# Patient Record
Sex: MALE | Race: WHITE | Employment: STUDENT | URBAN - METROPOLITAN AREA
[De-identification: names, ages, dates, MRNs, and addresses within clinical notes are randomized per-mention and may not be internally consistent; named-entity substitution may affect disease eponyms.]

---

## 2023-02-08 ENCOUNTER — LAB ENCOUNTER (OUTPATIENT)
Dept: LAB | Age: 23
End: 2023-02-08
Attending: FAMILY MEDICINE
Payer: MEDICAID

## 2023-02-08 DIAGNOSIS — M25.50 POLYARTHRALGIA: ICD-10-CM

## 2023-02-08 PROBLEM — F84.0 AUTISM (HCC): Status: ACTIVE | Noted: 2023-02-08

## 2023-02-08 PROBLEM — F84.0 AUTISM: Status: ACTIVE | Noted: 2023-02-08

## 2023-02-08 PROBLEM — J30.2 SEASONAL ALLERGIES: Status: ACTIVE | Noted: 2023-02-08

## 2023-02-08 PROBLEM — F98.8 ATTENTION DEFICIT DISORDER (ADD): Status: ACTIVE | Noted: 2023-02-08

## 2023-02-08 PROBLEM — J35.01 CHRONIC TONSILLITIS: Status: ACTIVE | Noted: 2023-02-08

## 2023-02-08 LAB
BASOPHILS # BLD AUTO: 0.05 X10(3) UL (ref 0–0.2)
BASOPHILS NFR BLD AUTO: 0.8 %
CRP SERPL-MCNC: <0.29 MG/DL (ref ?–0.3)
EOSINOPHIL # BLD AUTO: 0.07 X10(3) UL (ref 0–0.7)
EOSINOPHIL NFR BLD AUTO: 1.2 %
ERYTHROCYTE [DISTWIDTH] IN BLOOD BY AUTOMATED COUNT: 12.3 %
HCT VFR BLD AUTO: 47.2 %
HGB BLD-MCNC: 15.7 G/DL
IMM GRANULOCYTES # BLD AUTO: 0.02 X10(3) UL (ref 0–1)
IMM GRANULOCYTES NFR BLD: 0.3 %
LYMPHOCYTES # BLD AUTO: 1.61 X10(3) UL (ref 1–4)
LYMPHOCYTES NFR BLD AUTO: 26.5 %
MCH RBC QN AUTO: 29.7 PG (ref 26–34)
MCHC RBC AUTO-ENTMCNC: 33.3 G/DL (ref 31–37)
MCV RBC AUTO: 89.4 FL
MONOCYTES # BLD AUTO: 0.56 X10(3) UL (ref 0.1–1)
MONOCYTES NFR BLD AUTO: 9.2 %
NEUTROPHILS # BLD AUTO: 3.76 X10 (3) UL (ref 1.5–7.7)
NEUTROPHILS # BLD AUTO: 3.76 X10(3) UL (ref 1.5–7.7)
NEUTROPHILS NFR BLD AUTO: 62 %
PLATELET # BLD AUTO: 256 10(3)UL (ref 150–450)
RBC # BLD AUTO: 5.28 X10(6)UL
RHEUMATOID FACT SERPL-ACNC: 17 IU/ML (ref ?–15)
T4 FREE SERPL-MCNC: 1 NG/DL (ref 0.8–1.7)
TSI SER-ACNC: 1.4 MIU/ML (ref 0.36–3.74)
WBC # BLD AUTO: 6.1 X10(3) UL (ref 4–11)

## 2023-02-08 PROCEDURE — 85025 COMPLETE CBC W/AUTO DIFF WBC: CPT

## 2023-02-08 PROCEDURE — 84443 ASSAY THYROID STIM HORMONE: CPT

## 2023-02-08 PROCEDURE — 36415 COLL VENOUS BLD VENIPUNCTURE: CPT

## 2023-02-08 PROCEDURE — 86431 RHEUMATOID FACTOR QUANT: CPT

## 2023-02-08 PROCEDURE — 86140 C-REACTIVE PROTEIN: CPT

## 2023-02-08 PROCEDURE — 84439 ASSAY OF FREE THYROXINE: CPT

## 2023-02-09 PROBLEM — R76.8 RHEUMATOID FACTOR POSITIVE: Status: ACTIVE | Noted: 2023-02-09

## 2023-04-25 ENCOUNTER — TELEPHONE (OUTPATIENT)
Dept: FAMILY MEDICINE CLINIC | Facility: CLINIC | Age: 23
End: 2023-04-25

## 2023-04-25 NOTE — TELEPHONE ENCOUNTER
RECEIVED  8/24/23    SENT TO SCAN STAT  8/25/23    [place] [employer]  DIV OF REHAB [facility],DISABILITY DETERMINATION  [address] [address]  [place] [address]    ALL RECORDS FROM 4/6/22 TO PRESENT BY 5/3/23

## 2023-05-19 ENCOUNTER — OFFICE VISIT (OUTPATIENT)
Dept: RHEUMATOLOGY | Facility: CLINIC | Age: 23
End: 2023-05-19
Payer: MEDICAID

## 2023-05-19 VITALS
HEART RATE: 66 BPM | DIASTOLIC BLOOD PRESSURE: 58 MMHG | SYSTOLIC BLOOD PRESSURE: 90 MMHG | RESPIRATION RATE: 16 BRPM | OXYGEN SATURATION: 96 % | TEMPERATURE: 97 F | WEIGHT: 147.13 LBS | BODY MASS INDEX: 22.04 KG/M2 | HEIGHT: 68.5 IN

## 2023-05-19 DIAGNOSIS — M25.50 ARTHRALGIA, UNSPECIFIED JOINT: ICD-10-CM

## 2023-05-19 DIAGNOSIS — R76.8 RHEUMATOID FACTOR POSITIVE: ICD-10-CM

## 2023-05-19 DIAGNOSIS — M25.50 HYPERMOBILITY ARTHRALGIA: Primary | ICD-10-CM

## 2023-05-19 DIAGNOSIS — M24.80 GENERALIZED HYPERMOBILITY OF JOINTS: ICD-10-CM

## 2023-05-19 DIAGNOSIS — Z51.81 THERAPEUTIC DRUG MONITORING: ICD-10-CM

## 2023-05-19 PROCEDURE — 3074F SYST BP LT 130 MM HG: CPT | Performed by: INTERNAL MEDICINE

## 2023-05-19 PROCEDURE — 99244 OFF/OP CNSLTJ NEW/EST MOD 40: CPT | Performed by: INTERNAL MEDICINE

## 2023-05-19 PROCEDURE — 3078F DIAST BP <80 MM HG: CPT | Performed by: INTERNAL MEDICINE

## 2023-05-19 PROCEDURE — 3008F BODY MASS INDEX DOCD: CPT | Performed by: INTERNAL MEDICINE

## 2023-05-19 RX ORDER — CETIRIZINE HYDROCHLORIDE 10 MG/1
TABLET ORAL
COMMUNITY

## 2023-05-19 RX ORDER — DEXTROAMPHETAMINE SACCHARATE, AMPHETAMINE ASPARTATE MONOHYDRATE, DEXTROAMPHETAMINE SULFATE AND AMPHETAMINE SULFATE 7.5; 7.5; 7.5; 7.5 MG/1; MG/1; MG/1; MG/1
30 CAPSULE, EXTENDED RELEASE ORAL EVERY MORNING
COMMUNITY

## 2023-05-23 ENCOUNTER — DOCUMENTATION ONLY (OUTPATIENT)
Dept: FAMILY MEDICINE CLINIC | Facility: CLINIC | Age: 23
End: 2023-05-23

## 2023-05-23 PROBLEM — M21.40 FLAT FOOT: Status: ACTIVE | Noted: 2023-05-23

## 2023-05-24 ENCOUNTER — TELEPHONE (OUTPATIENT)
Dept: FAMILY MEDICINE CLINIC | Facility: CLINIC | Age: 23
End: 2023-05-24

## 2023-05-24 NOTE — TELEPHONE ENCOUNTER
desvenlafaxine ER 25 MG Oral Tablet 24 Hr         NEED ADDITIONAL INFORMATION    FAX IN [place] [facility] FILE

## 2023-06-27 ENCOUNTER — OFFICE VISIT (OUTPATIENT)
Facility: LOCATION | Age: 23
End: 2023-06-27
Attending: INTERNAL MEDICINE
Payer: MEDICAID

## 2023-06-27 DIAGNOSIS — M25.50 ARTHRALGIA, UNSPECIFIED JOINT: ICD-10-CM

## 2023-06-27 DIAGNOSIS — M24.80 GENERALIZED HYPERMOBILITY OF JOINTS: ICD-10-CM

## 2023-06-27 DIAGNOSIS — R76.8 RHEUMATOID FACTOR POSITIVE: Primary | ICD-10-CM

## 2023-06-27 DIAGNOSIS — Z51.81 THERAPEUTIC DRUG MONITORING: ICD-10-CM

## 2023-06-27 DIAGNOSIS — M25.50 HYPERMOBILITY ARTHRALGIA: ICD-10-CM

## 2023-06-27 PROCEDURE — 97110 THERAPEUTIC EXERCISES: CPT

## 2023-06-27 PROCEDURE — 97161 PT EVAL LOW COMPLEX 20 MIN: CPT

## 2023-07-06 ENCOUNTER — OFFICE VISIT (OUTPATIENT)
Facility: LOCATION | Age: 23
End: 2023-07-06
Attending: INTERNAL MEDICINE
Payer: MEDICAID

## 2023-07-06 PROCEDURE — 97110 THERAPEUTIC EXERCISES: CPT

## 2023-07-06 NOTE — PROGRESS NOTES
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)  Generalized hypermobility of joints (M24.80)  Arthralgia, unspecified joint (M25.50)   Referring Provider: Cecelia Sanchez  Date of Evaluation:    6/27/23    Precautions:  Autism,   ADHD, hypermobility  Next MD visit:   none scheduled  Date of Surgery: n/a   Insurance Primary/Secondary: BLUE CROSS MEDICAID / N/A     # Auth Visits: 8            Subjective: most pain is at night when done with activity     Pain:  3/10      Objective:   See flowsheet      Assessment:   Very vague in his ankle/foot and leg pain. Wearing good shoes and the orthotics purchased which does help to a certain extent  Significant pronation, navicular drop, abducted forefoot, hindfoot valgus       Goals:   Pt will demonstrate improved DF AROM to >= 15 degrees   Pt will have increased ankle strength to 5/5 throughout for improved ankle control with gait and stair negotiation  Pt will have demonstrate at least 4/5 (B) hip strength to maximize effectiveness and efficiency with functional activities   Pt will report <2/10 pain with work and home activities such  Pt will be able to walk 1-2 miles with <2/10 discomfort   Pt will be independent and compliant with comprehensive HEP to maintain progress achieved in PT     Plan: improve LE strength  Date: 7/6/2023  TX#: 2/8 Date:                 TX#: 3/ Date:                 TX#: 4/ Date:                 TX#: 5/ Date: Tx#: 6/   Manual Therapy              TherEx       Supine HS stretch x 10 (5 second hold)       Seated towel scrunch x 2 min       Seated (B) ankle/foot inversion picking up ball x 20       Seated ankle inversion GTB x 30       SL clam GTB x 20       SL hip abduction x 20       Bridge x 10 (5 seconds)       Standing runner's stretch (knee straight & bent) 30 sec x 3       Upright bike x 8 min        Ankle taping (navicular taping)       HEP: Standing gastroc and soleus stretch, seated towel scrunches     Charges:  TherEx 3       Total Timed Treatment: 40 min  Total Treatment Time: 40 min

## 2023-07-11 ENCOUNTER — APPOINTMENT (OUTPATIENT)
Facility: LOCATION | Age: 23
End: 2023-07-11
Attending: INTERNAL MEDICINE
Payer: MEDICAID

## 2023-07-13 ENCOUNTER — OFFICE VISIT (OUTPATIENT)
Facility: LOCATION | Age: 23
End: 2023-07-13
Attending: INTERNAL MEDICINE
Payer: MEDICAID

## 2023-07-13 PROCEDURE — 97110 THERAPEUTIC EXERCISES: CPT

## 2023-07-13 NOTE — PROGRESS NOTES
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 Encouraged to do this at home if it relieves pain  Walks with navicular drop, pronated foot, abducted feet. Tolerated exercises well with no significant increase in pain       Goals:   Pt will demonstrate improved DF AROM to >= 15 degrees  Pt will have increased ankle strength to 5/5 throughout for improved ankle control with gait and stair negotiation  Pt will have demonstrate at least 4/5 (B) hip strength to maximize effectiveness and efficiency with functional activities   Pt will report <2/10 pain with work and home activities such  Pt will be able to walk 1-2 miles with <2/10 discomfort   Pt will be independent and compliant with comprehensive HEP to maintain progress achieved in PT     Plan: improve LE strength  Date: 7/6/2023  TX#: 2/8 Date:               TX#:  Date:                 TX#: 4/ Date:                 TX#: 5/ Date:    Tx#: 6/   Manual Therapy Manual Therapy             TherEx TherEx      Supine HS stretch x 10 (5 second hold) Ankle DF self mobilization on step x 10 each (5 second hold)      Seated towel scrunch x 2 min Seated towel scrunch x 2 min      Seated (B) ankle/foot inversion picking up ball x 20 Seated (B) ankle/foot inversion picking up ball x 20      Seated ankle inversion GTB x 30 Seated ankle inversion GTB x 30      SL clam GTB x 20 Prostretch 1 min x 2 (B)       SL hip abduction x 20 Upright bike x 8 min (L5) Bridge x 10 (5 seconds) Did not do ankle taping this time (reports moisture from foot causes it to come off - did help a bit)      Standing runner's stretch (knee straight & bent) 30 sec x 3       Upright bike x 8 min        Ankle taping (navicular taping)       HEP: Standing gastroc and soleus stretch, seated towel scrunches     Charges:  TherEx 2       Total Timed Treatment: 30 min  Total Treatment Time: 30 min

## 2023-07-18 ENCOUNTER — APPOINTMENT (OUTPATIENT)
Facility: LOCATION | Age: 23
End: 2023-07-18
Attending: INTERNAL MEDICINE
Payer: MEDICAID

## 2023-07-20 ENCOUNTER — APPOINTMENT (OUTPATIENT)
Facility: LOCATION | Age: 23
End: 2023-07-20
Attending: INTERNAL MEDICINE
Payer: MEDICAID

## 2023-07-20 ENCOUNTER — TELEPHONE (OUTPATIENT)
Dept: PHYSICAL THERAPY | Facility: HOSPITAL | Age: 23
End: 2023-07-20

## 2023-07-24 ENCOUNTER — OFFICE VISIT (OUTPATIENT)
Facility: LOCATION | Age: 23
End: 2023-07-24
Attending: INTERNAL MEDICINE
Payer: MEDICAID

## 2023-07-24 PROCEDURE — 97110 THERAPEUTIC EXERCISES: CPT

## 2023-07-24 NOTE — PROGRESS NOTES
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 Did respond well to taping to support arch        Goals:   Pt will demonstrate improved DF AROM to >= 15 degrees  Pt will have increased ankle strength to 5/5 throughout for improved ankle control with gait and stair negotiation  Pt will have demonstrate at least 4/5 (B) hip strength to maximize effectiveness and efficiency with functional activities   Pt will report <2/10 pain with work and home activities such  Pt will be able to walk 1-2 miles with <2/10 discomfort   Pt will be independent and compliant with comprehensive HEP to maintain progress achieved in PT     Plan: improve LE strength  Date: 7/6/2023  TX#: 2/8 Date:               TX#: 3/8 Date: 7/24/23              TX#: 4/8 Date:                 TX#: 5/ Date:    Tx#: 6/   Manual Therapy Manual Therapy Manual Therapy            TherEx TherEx TherEx     Supine HS stretch x 10 (5 second hold) Ankle DF self mobilization on step x 10 each (5 second hold) Ankle DF self mobilization on step x 10 each (5 second hold)     Seated towel scrunch x 2 min Seated towel scrunch x 2 min Seated towel scrunch x 2 min     Seated (B) ankle/foot inversion picking up ball x 20 Seated (B) ankle/foot inversion picking up ball x 20 Seated (B) ankle/foot inversion picking up ball x 20     Seated ankle inversion GTB x 30 Seated ankle inversion GTB x 30 Seated ankle inversion GTB x 30     SL clam GTB x 20 Prostretch 1 min x 2 (B)  Prostretch 1 min x 2 (B)      SL hip abduction x 20 Upright bike x 8 min (L5)  Upright bike x 8 min (L5)      Bridge x 10 (5 seconds) Did not do ankle taping this time (reports moisture from foot causes it to come off - did help a bit) Bridge w/GTB around knees x 20     Standing runner's stretch (knee straight & bent) 30 sec x 3  Navicular taping to help support arch     Upright bike x 8 min   Shuttle 175# (DL) x 20  Shuttle 112# (SL) x 20     Ankle taping (navicular taping)  Heel raises x 20      HEP: Standing gastroc and soleus stretch, seated towel scrunches     Charges:  TherEx   3   Total Timed Treatment:40  min  Total Treatment Time: 40 min

## 2023-07-25 ENCOUNTER — APPOINTMENT (OUTPATIENT)
Facility: LOCATION | Age: 23
End: 2023-07-25
Attending: INTERNAL MEDICINE
Payer: MEDICAID

## 2023-07-25 NOTE — PROGRESS NOTES
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)  Generalized hypermobility of joints (M24.80)  Arthralgia, unspecified joint (M25.50)   Referring Provider: Tereza Sena  Date of Evaluation:    6/27/23    Precautions:  Autism,   ADHD, hypermobility  Next MD visit:   none scheduled  Date of Surgery: n/a   Insurance Primary/Secondary: BLUE CROSS MEDICAID / N/A     # Auth Visits: 8            Subjective: ***    Pain: ***      Objective:          Assessment:         Goals:   Pt will demonstrate improved DF AROM to >= 15 degrees  Pt will have increased ankle strength to 5/5 throughout for improved ankle control with gait and stair negotiation  Pt will have demonstrate at least 4/5 (B) hip strength to maximize effectiveness and efficiency with functional activities   Pt will report <2/10 pain with work and home activities such  Pt will be able to walk 1-2 miles with <2/10 discomfort   Pt will be independent and compliant with comprehensive HEP to maintain progress achieved in PT     Plan: improve LE strength  Date: 7/6/2023  TX#: 2/8 Date:               TX#: 3/8 Date: 7/24/23              TX#: 4/8 Date:             TX#: /8 Date:    Tx#: 6/   Manual Therapy Manual Therapy Manual Therapy Manual Therapy           TherEx TherEx TherEx TherEx    Supine HS stretch x 10 (5 second hold) Ankle DF self mobilization on step x 10 each (5 second hold) Ankle DF self mobilization on step x 10 each (5 second hold)     Seated towel scrunch x 2 min Seated towel scrunch x 2 min Seated towel scrunch x 2 min     Seated (B) ankle/foot inversion picking up ball x 20 Seated (B) ankle/foot inversion picking up ball x 20 Seated (B) ankle/foot inversion picking up ball x 20     Seated ankle inversion GTB x 30 Seated ankle inversion GTB x 30 Seated ankle inversion GTB x 30     SL clam GTB x 20 Prostretch 1 min x 2 (B)  Prostretch 1 min x 2 (B)      SL hip abduction x 20 Upright bike x 8 min (L5)  Upright bike x 8 min (L5)      Bridge x 10 (5 seconds) Did not do ankle taping this time (reports moisture from foot causes it to come off - did help a bit) Bridge w/GTB around knees x 20     Standing runner's stretch (knee straight & bent) 30 sec x 3  Navicular taping to help support arch     Upright bike x 8 min   Shuttle 175# (DL) x 20  Shuttle 112# (SL) x 20     Ankle taping (navicular taping)  Heel raises x 20      HEP: Standing gastroc and soleus stretch, seated towel scrunches     Charges:  TherEx      Total Timed Treatment:  min  Total Treatment Time:  min

## 2023-07-28 ENCOUNTER — APPOINTMENT (OUTPATIENT)
Facility: LOCATION | Age: 23
End: 2023-07-28
Attending: INTERNAL MEDICINE
Payer: MEDICAID

## 2023-07-28 NOTE — PROGRESS NOTES
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)  Generalized hypermobility of joints (M24.80)  Arthralgia, unspecified joint (M25.50)   Referring Provider: Sonia Samuels  Date of Evaluation:    6/27/23    Precautions:  Autism,   ADHD, hypermobility  Next MD visit:   none scheduled  Date of Surgery: n/a   Insurance Primary/Secondary: BLUE CROSS MEDICAID / N/A     # Auth Visits: 8            Subjective: ***    Pain: ***      Objective:    See flowsheet      Assessment:         Goals:   Pt will demonstrate improved DF AROM to >= 15 degrees  Pt will have increased ankle strength to 5/5 throughout for improved ankle control with gait and stair negotiation  Pt will have demonstrate at least 4/5 (B) hip strength to maximize effectiveness and efficiency with functional activities   Pt will report <2/10 pain with work and home activities   Pt will be able to walk 1-2 miles with <2/10 discomfort   Pt will be independent and compliant with comprehensive HEP to maintain progress achieved in PT     Plan: improve LE strength  Date: 7/6/2023  TX#: 2/8 Date:               TX#: 3/8 Date: 7/24/23              TX#: 4/8 Date: 7/28/23        TX#: 5/8 Date:    Tx#: 6/   Manual Therapy Manual Therapy Manual Therapy Manual Therapy           TherEx TherEx TherEx TherEx    Supine HS stretch x 10 (5 second hold) Ankle DF self mobilization on step x 10 each (5 second hold) Ankle DF self mobilization on step x 10 each (5 second hold)     Seated towel scrunch x 2 min Seated towel scrunch x 2 min Seated towel scrunch x 2 min     Seated (B) ankle/foot inversion picking up ball x 20 Seated (B) ankle/foot inversion picking up ball x 20 Seated (B) ankle/foot inversion picking up ball x 20     Seated ankle inversion GTB x 30 Seated ankle inversion GTB x 30 Seated ankle inversion GTB x 30     SL clam GTB x 20 Prostretch 1 min x 2 (B)  Prostretch 1 min x 2 (B)      SL hip abduction x 20 Upright bike x 8 min (L5)  Upright bike x 8 min (L5)      Bridge x 10 (5 seconds) Did not do ankle taping this time (reports moisture from foot causes it to come off - did help a bit) Bridge w/GTB around knees x 20     Standing runner's stretch (knee straight & bent) 30 sec x 3  Navicular taping to help support arch     Upright bike x 8 min   Shuttle 175# (DL) x 20  Shuttle 112# (SL) x 20     Ankle taping (navicular taping)  Heel raises x 20      HEP: Standing gastroc and soleus stretch, seated towel scrunches     Charges:  TherEx      Total Timed Treatment:  min  Total Treatment Time:  min

## 2023-08-10 ENCOUNTER — OFFICE VISIT (OUTPATIENT)
Facility: LOCATION | Age: 23
End: 2023-08-10
Payer: MEDICAID

## 2023-08-10 DIAGNOSIS — J35.1 CHRONIC TONSILLAR HYPERTROPHY: Primary | ICD-10-CM

## 2023-08-10 DIAGNOSIS — J35.01 CHRONIC TONSILLITIS: ICD-10-CM

## 2023-08-10 PROCEDURE — 99204 OFFICE O/P NEW MOD 45 MIN: CPT | Performed by: OTOLARYNGOLOGY

## 2023-08-10 RX ORDER — CEPHALEXIN 500 MG/1
500 CAPSULE ORAL DAILY
Qty: 30 CAPSULE | Refills: 1 | Status: SHIPPED | OUTPATIENT
Start: 2023-08-10 | End: 2023-09-09

## 2023-08-10 NOTE — PROGRESS NOTES
[facility], [facility] Yara Hatfield    Report of Consultation    Date of Consult: 8/10/2023     Reason for Consultation:   Recurrent tonsillitis. History of Present Illness:   Patient is a 23year old male who is being seen for recurrent tonsillitis. Patient notes he gets white dots in his tonsil bed and feels that he has to clear his throat. He denies fevers chills nausea or vomiting. He always has to clear his throat and he snores at times with halted respirations. Past Medical History  Past Medical History:   Diagnosis Date    Attention deficit disorder (ADD)     Autism     Seasonal allergies        Past Surgical History  History reviewed. No pertinent surgical history. Family History  Family History   Problem Relation Age of Onset    Juvenile Rheumatoid Arthritis Mother        Social History  Patient Parents    Yadiel Dupont (Mother)    Other Topics            Concern    None on file    Social History Narrative    None on file            Current Medications:  Current Outpatient Medications   Medication Sig Dispense Refill    cephalexin 500 MG Oral Cap Take 1 capsule (500 mg total) by mouth daily for 30 doses. 30 capsule 1    ARIPiprazole (ABILIFY) 10 MG Oral Tab Take 1 tablet (10 mg total) by mouth daily. 30 tablet 3    [START ON 8/24/2023] amphetamine-dextroamphetamine ER (ADDERALL XR) 30 MG Oral Capsule SR 24 Hr Take 1 capsule (30 mg total) by mouth daily. 30 capsule 0    [START ON 9/23/2023] amphetamine-dextroamphetamine ER (ADDERALL XR) 30 MG Oral Capsule SR 24 Hr Take 1 capsule (30 mg total) by mouth daily. 30 capsule 0    [START ON 10/23/2023] amphetamine-dextroamphetamine ER (ADDERALL XR) 30 MG Oral Capsule SR 24 Hr Take 1 capsule (30 mg total) by mouth daily. 30 capsule 0    [START ON 8/24/2023] methylphenidate (RITALIN) 10 MG Oral Tab Take 1 tablet (10 mg total) by mouth daily.  30 tablet 0    [START ON 9/23/2023] methylphenidate (RITALIN) 10 MG Oral Tab Take 1 tablet (10 mg total) by mouth daily. 30 tablet 0    [START ON 10/23/2023] methylphenidate (RITALIN) 10 MG Oral Tab Take 1 tablet (10 mg total) by mouth daily. 30 tablet 0    amphetamine-dextroamphetamine ER (ADDERALL XR) 30 MG Oral Capsule SR 24 Hr Take 1 capsule (30 mg total) by mouth daily. 30 capsule 0    methylphenidate (RITALIN) 10 MG Oral Tab Take 1 tablet (10 mg total) by mouth daily. In afternoon 30 tablet 0    cetirizine 10 MG Oral Tab cetirizine 10 mg tablet   TAKE 1 TABLET BY MOUTH AT BEDTIME         Allergies    Dander                  UNKNOWN  Molds & Smuts           UNKNOWN  Cat Hair Extract        Coughing, ITCHING, PHOTOSENSITIVITY    Review of Systems:   A comprehensive review of systems was negative. Physical Exam:   There were no vitals taken for this visit. Constitutional Normal Overall appearance - Normal.   Psychiatric Normal Orientation - Oriented to time, place, person & situation. Appropriate mood and affect. Head/Face Normal Facial features -- Normal. Skull - Normal.   Eyes Normal Pupils equal ,round ,react to light and accomidate   Ears Normal External Ear Right: Normal, Left: Normal. Canal - Right: Normal, Left: Normal. TM - Right: Normal, Left: Normal.   Nose Normal External Nose, Normal, Septum -Midline, Right, Left Turbinates - Right: Normal, Hypertrophic Left: Normal, Hypertrophic   Mouth/Throat Normal Lips/teeth/gums - Normal. Tonsils -4+ with large crypts. Neck Exam Normal Inspection - Normal. Palpation - Normal. Parotid gland - Normal. Thyroid gland - Normal.   Neurological Normal Memory - Normal. Cranial nerves - Cranial nerves II through XII grossly intact. Nasopharynx Normal  Normal        Skin Normal Inspection - Normal.        Lymph Detail Normal Submental. Submandibular. Anterior cervical. Posterior cervical. Supraclavicular.        Results:     Laboratory Data:  Lab Results   Component Value Date    WBC 6.1 02/08/2023    HGB 15.7 02/08/2023    HCT 47.2 02/08/2023 .0 02/08/2023    T4F 1.0 02/08/2023    TSH 1.400 02/08/2023    CRP <0.29 02/08/2023         Imaging:  No results found. Impression:   Chronic obstructive adenotonsillitis. Recommendations:  He will undergo tonsillectomy and adenectomy. Risk complications alternative treatments were discussed with the patient and mother and they understand. He will undergo procedure during one of his breaks. In the interim he was started on Keflex daily. Thank you for allowing me to participate in the care of your patient.       Corey Kim MD  8/10/2023

## 2023-08-14 DIAGNOSIS — J35.3 HYPERTROPHY OF TONSILS AND ADENOIDS: Primary | ICD-10-CM

## 2023-09-22 DIAGNOSIS — F98.8 ATTENTION DEFICIT DISORDER (ADD) WITHOUT HYPERACTIVITY: ICD-10-CM

## 2023-09-22 RX ORDER — DEXTROAMPHETAMINE SACCHARATE, AMPHETAMINE ASPARTATE MONOHYDRATE, DEXTROAMPHETAMINE SULFATE AND AMPHETAMINE SULFATE 7.5; 7.5; 7.5; 7.5 MG/1; MG/1; MG/1; MG/1
30 CAPSULE, EXTENDED RELEASE ORAL DAILY
Qty: 30 CAPSULE | Refills: 0 | Status: SHIPPED | OUTPATIENT
Start: 2023-09-22 | End: 2023-10-22

## 2023-09-22 NOTE — TELEPHONE ENCOUNTER
A refill request was received for:  Requested Prescriptions     Pending Prescriptions Disp Refills    amphetamine-dextroamphetamine ER (ADDERALL XR) 30 MG Oral Capsule SR 24 Hr 30 capsule 0     Sig: Take 1 capsule (30 mg total) by mouth daily.        Last refill date:10/23/2023       Last office visit: 8/1/2023    Follow up due:  Future Appointments   Date Time Provider [facility]   10/9/2023  3:00 PM Saqib Green MD [facility] [address] [facility] [address]   11/16/2023  9:30 AM Tyrese Emmanuel MD [id number] [address]

## 2023-10-13 ENCOUNTER — HOSPITAL ENCOUNTER (OUTPATIENT)
Facility: HOSPITAL | Age: 23
Setting detail: HOSPITAL OUTPATIENT SURGERY
Discharge: HOME | End: 2023-10-13
Attending: OTOLARYNGOLOGY | Admitting: OTOLARYNGOLOGY
Payer: MEDICAID

## 2023-10-13 ENCOUNTER — ANESTHESIA EVENT (OUTPATIENT)
Dept: SURGERY | Facility: HOSPITAL | Age: 23
End: 2023-10-13
Payer: MEDICAID

## 2023-10-13 ENCOUNTER — ANESTHESIA (OUTPATIENT)
Dept: SURGERY | Facility: HOSPITAL | Age: 23
End: 2023-10-13
Payer: MEDICAID

## 2023-10-13 VITALS
TEMPERATURE: 99 F | BODY MASS INDEX: 20.25 KG/M2 | OXYGEN SATURATION: 96 % | HEIGHT: 68.5 IN | RESPIRATION RATE: 18 BRPM | DIASTOLIC BLOOD PRESSURE: 81 MMHG | HEART RATE: 64 BPM | WEIGHT: 135.19 LBS | SYSTOLIC BLOOD PRESSURE: 117 MMHG

## 2023-10-13 DIAGNOSIS — J35.3 HYPERTROPHY OF TONSILS AND ADENOIDS: ICD-10-CM

## 2023-10-13 PROCEDURE — 0CBPXZZ EXCISION OF TONSILS, EXTERNAL APPROACH: ICD-10-PCS | Performed by: OTOLARYNGOLOGY

## 2023-10-13 PROCEDURE — 42826 REMOVAL OF TONSILS: CPT | Performed by: OTOLARYNGOLOGY

## 2023-10-13 RX ORDER — HYDROMORPHONE HYDROCHLORIDE 1 MG/ML
0.4 INJECTION, SOLUTION INTRAMUSCULAR; INTRAVENOUS; SUBCUTANEOUS EVERY 5 MIN PRN
Status: DISCONTINUED | OUTPATIENT
Start: 2023-10-13 | End: 2023-10-13

## 2023-10-13 RX ORDER — SODIUM CHLORIDE, SODIUM LACTATE, POTASSIUM CHLORIDE, CALCIUM CHLORIDE 600; 310; 30; 20 MG/100ML; MG/100ML; MG/100ML; MG/100ML
INJECTION, SOLUTION INTRAVENOUS CONTINUOUS
Status: DISCONTINUED | OUTPATIENT
Start: 2023-10-13 | End: 2023-10-13

## 2023-10-13 RX ORDER — BUPIVACAINE HYDROCHLORIDE AND EPINEPHRINE 5; 5 MG/ML; UG/ML
INJECTION, SOLUTION EPIDURAL; INTRACAUDAL; PERINEURAL AS NEEDED
Status: DISCONTINUED | OUTPATIENT
Start: 2023-10-13 | End: 2023-10-13 | Stop reason: HOSPADM

## 2023-10-13 RX ORDER — NEOSTIGMINE METHYLSULFATE 1 MG/ML
INJECTION, SOLUTION INTRAVENOUS AS NEEDED
Status: DISCONTINUED | OUTPATIENT
Start: 2023-10-13 | End: 2023-10-13 | Stop reason: SURG

## 2023-10-13 RX ORDER — HYDROCODONE BITARTRATE AND ACETAMINOPHEN 5; 325 MG/1; MG/1
1 TABLET ORAL ONCE AS NEEDED
Status: DISCONTINUED | OUTPATIENT
Start: 2023-10-13 | End: 2023-10-13

## 2023-10-13 RX ORDER — NALOXONE HYDROCHLORIDE 0.4 MG/ML
80 INJECTION, SOLUTION INTRAMUSCULAR; INTRAVENOUS; SUBCUTANEOUS AS NEEDED
Status: DISCONTINUED | OUTPATIENT
Start: 2023-10-13 | End: 2023-10-13

## 2023-10-13 RX ORDER — DEXAMETHASONE SODIUM PHOSPHATE 4 MG/ML
VIAL (ML) INJECTION AS NEEDED
Status: DISCONTINUED | OUTPATIENT
Start: 2023-10-13 | End: 2023-10-13 | Stop reason: SURG

## 2023-10-13 RX ORDER — SCOLOPAMINE TRANSDERMAL SYSTEM 1 MG/1
1 PATCH, EXTENDED RELEASE TRANSDERMAL ONCE
Status: DISCONTINUED | OUTPATIENT
Start: 2023-10-13 | End: 2023-10-13 | Stop reason: HOSPADM

## 2023-10-13 RX ORDER — GLYCOPYRROLATE 0.2 MG/ML
INJECTION, SOLUTION INTRAMUSCULAR; INTRAVENOUS AS NEEDED
Status: DISCONTINUED | OUTPATIENT
Start: 2023-10-13 | End: 2023-10-13 | Stop reason: SURG

## 2023-10-13 RX ORDER — ACETAMINOPHEN 500 MG
1000 TABLET ORAL ONCE AS NEEDED
Status: DISCONTINUED | OUTPATIENT
Start: 2023-10-13 | End: 2023-10-13

## 2023-10-13 RX ORDER — DIPHENHYDRAMINE HYDROCHLORIDE 50 MG/ML
12.5 INJECTION INTRAMUSCULAR; INTRAVENOUS AS NEEDED
Status: DISCONTINUED | OUTPATIENT
Start: 2023-10-13 | End: 2023-10-13

## 2023-10-13 RX ORDER — LIDOCAINE HYDROCHLORIDE 10 MG/ML
INJECTION, SOLUTION EPIDURAL; INFILTRATION; INTRACAUDAL; PERINEURAL AS NEEDED
Status: DISCONTINUED | OUTPATIENT
Start: 2023-10-13 | End: 2023-10-13 | Stop reason: SURG

## 2023-10-13 RX ORDER — ACETAMINOPHEN 500 MG
1000 TABLET ORAL ONCE
Status: DISCONTINUED | OUTPATIENT
Start: 2023-10-13 | End: 2023-10-13 | Stop reason: HOSPADM

## 2023-10-13 RX ORDER — LABETALOL HYDROCHLORIDE 5 MG/ML
5 INJECTION, SOLUTION INTRAVENOUS EVERY 5 MIN PRN
Status: DISCONTINUED | OUTPATIENT
Start: 2023-10-13 | End: 2023-10-13

## 2023-10-13 RX ORDER — HYDROCODONE BITARTRATE AND ACETAMINOPHEN 5; 325 MG/1; MG/1
2 TABLET ORAL ONCE AS NEEDED
Status: DISCONTINUED | OUTPATIENT
Start: 2023-10-13 | End: 2023-10-13

## 2023-10-13 RX ORDER — HYDROMORPHONE HYDROCHLORIDE 1 MG/ML
0.6 INJECTION, SOLUTION INTRAMUSCULAR; INTRAVENOUS; SUBCUTANEOUS EVERY 5 MIN PRN
Status: DISCONTINUED | OUTPATIENT
Start: 2023-10-13 | End: 2023-10-13

## 2023-10-13 RX ORDER — ROCURONIUM BROMIDE 10 MG/ML
INJECTION, SOLUTION INTRAVENOUS AS NEEDED
Status: DISCONTINUED | OUTPATIENT
Start: 2023-10-13 | End: 2023-10-13 | Stop reason: SURG

## 2023-10-13 RX ORDER — ACETAMINOPHEN 325 MG/1
650 TABLET ORAL ONCE
Status: DISCONTINUED | OUTPATIENT
Start: 2023-10-13 | End: 2023-10-13

## 2023-10-13 RX ORDER — ONDANSETRON 2 MG/ML
INJECTION INTRAMUSCULAR; INTRAVENOUS AS NEEDED
Status: DISCONTINUED | OUTPATIENT
Start: 2023-10-13 | End: 2023-10-13 | Stop reason: SURG

## 2023-10-13 RX ORDER — ONDANSETRON 2 MG/ML
4 INJECTION INTRAMUSCULAR; INTRAVENOUS EVERY 6 HOURS PRN
Status: DISCONTINUED | OUTPATIENT
Start: 2023-10-13 | End: 2023-10-13

## 2023-10-13 RX ORDER — PROCHLORPERAZINE EDISYLATE 5 MG/ML
5 INJECTION INTRAMUSCULAR; INTRAVENOUS EVERY 8 HOURS PRN
Status: DISCONTINUED | OUTPATIENT
Start: 2023-10-13 | End: 2023-10-13

## 2023-10-13 RX ORDER — MIDAZOLAM HYDROCHLORIDE 1 MG/ML
INJECTION INTRAMUSCULAR; INTRAVENOUS AS NEEDED
Status: DISCONTINUED | OUTPATIENT
Start: 2023-10-13 | End: 2023-10-13 | Stop reason: SURG

## 2023-10-13 RX ORDER — ONDANSETRON 4 MG/1
4 TABLET, ORALLY DISINTEGRATING ORAL EVERY 4 HOURS PRN
Qty: 10 TABLET | Refills: 1 | Status: SHIPPED | OUTPATIENT
Start: 2023-10-13

## 2023-10-13 RX ORDER — MEPERIDINE HYDROCHLORIDE 25 MG/ML
INJECTION INTRAMUSCULAR; INTRAVENOUS; SUBCUTANEOUS
Status: COMPLETED
Start: 2023-10-13 | End: 2023-10-13

## 2023-10-13 RX ORDER — HYDROMORPHONE HYDROCHLORIDE 1 MG/ML
0.2 INJECTION, SOLUTION INTRAMUSCULAR; INTRAVENOUS; SUBCUTANEOUS EVERY 5 MIN PRN
Status: DISCONTINUED | OUTPATIENT
Start: 2023-10-13 | End: 2023-10-13

## 2023-10-13 RX ORDER — MEPERIDINE HYDROCHLORIDE 25 MG/ML
12.5 INJECTION INTRAMUSCULAR; INTRAVENOUS; SUBCUTANEOUS AS NEEDED
Status: DISCONTINUED | OUTPATIENT
Start: 2023-10-13 | End: 2023-10-13

## 2023-10-13 RX ADMIN — GLYCOPYRROLATE 0.4 MG: 0.2 INJECTION, SOLUTION INTRAMUSCULAR; INTRAVENOUS at 08:06:00

## 2023-10-13 RX ADMIN — SODIUM CHLORIDE, SODIUM LACTATE, POTASSIUM CHLORIDE, CALCIUM CHLORIDE: 600; 310; 30; 20 INJECTION, SOLUTION INTRAVENOUS at 07:34:00

## 2023-10-13 RX ADMIN — ROCURONIUM BROMIDE 10 MG: 10 INJECTION, SOLUTION INTRAVENOUS at 07:40:00

## 2023-10-13 RX ADMIN — MIDAZOLAM HYDROCHLORIDE 2 MG: 1 INJECTION INTRAMUSCULAR; INTRAVENOUS at 07:34:00

## 2023-10-13 RX ADMIN — DEXAMETHASONE SODIUM PHOSPHATE 8 MG: 4 MG/ML VIAL (ML) INJECTION at 07:42:00

## 2023-10-13 RX ADMIN — ONDANSETRON 4 MG: 2 INJECTION INTRAMUSCULAR; INTRAVENOUS at 07:34:00

## 2023-10-13 RX ADMIN — LIDOCAINE HYDROCHLORIDE 100 MG: 10 INJECTION, SOLUTION EPIDURAL; INFILTRATION; INTRACAUDAL; PERINEURAL at 07:40:00

## 2023-10-13 RX ADMIN — NEOSTIGMINE METHYLSULFATE 3 MG: 1 INJECTION, SOLUTION INTRAVENOUS at 08:06:00

## 2023-10-13 NOTE — OPERATIVE REPORT
[facility]    PATIENT'S NAME: Carley Limon   ATTENDING PHYSICIAN: Gary Beyer M.D. OPERATING PHYSICIAN: Gary Beyer M.D. PATIENT ACCOUNT#:   [de-identified]    LOCATION:  [id number] [place] [address] #:   [id number]       YOB: 2000  ADMISSION DATE:       10/13/2023      OPERATION DATE:  10/13/2023    OPERATIVE REPORT      PREOPERATIVE DIAGNOSIS:  Chronic and obstructive tonsillitis. POSTOPERATIVE DIAGNOSIS:  Chronic and obstructive tonsillitis. PROCEDURE:  Tonsillectomy. ANESTHESIA:  General.    OPERATIVE TECHNIQUE:  The patient was taken to the operating room and placed in a supine position. After orotracheal intubation, routine prep and drape, procedure was commenced. The patient was in Shola position. McIvor mouth gag was placed and used to retract the tongue. Examination of the adenoid bed found no tissue present. First the right tonsil was grasped with an Allis forceps. Anterior, superior, and posterior pillars were incised with Coblation forceps. It was dissected down to its base and then removed. A like procedure was performed on left tonsil. Hemostasis was achieved with a combination of Coblation and suction cautery, and no further bleeding was seen. Approximately 5 mL of 0.5% Marcaine with epinephrine 1:100,000 were injected locally. The patient was awoken in the operating room and transferred to the recovery room in stable condition. Estimated blood loss was 10 mL.     Dictated By Gary Beyer M.D.  d: 10/13/2023 08:12:20  t: 10/13/2023 08:25:17  [facility] [id number]  /

## 2023-10-13 NOTE — BRIEF OP NOTE
Pre-Operative Diagnosis: Hypertrophy of tonsils and adenoids [J35.3]     Post-Operative Diagnosis: Hypertrophy of tonsils and adenoids [J35.3]      Procedure Performed:   Bilateral Tonsillectomy    Surgeon(s) and Role:     Jeronimo Cook MD - Primary    Assistant(s):        Surgical Findings: tonsils 4+, no adenoids present     Specimen: tonsils     Estimated Blood Loss: Blood Output: 10 mL (10/13/2023  8:01 AM)      Dictation Number:  done    Irina Hung MD  10/13/2023  8:08 AM

## 2023-10-18 ENCOUNTER — OFFICE VISIT (OUTPATIENT)
Facility: LOCATION | Age: 23
End: 2023-10-18
Payer: MEDICAID

## 2023-10-18 DIAGNOSIS — J35.01 CHRONIC TONSILLITIS: ICD-10-CM

## 2023-10-18 DIAGNOSIS — J35.1 CHRONIC TONSILLAR HYPERTROPHY: Primary | ICD-10-CM

## 2023-10-18 PROCEDURE — 99024 POSTOP FOLLOW-UP VISIT: CPT | Performed by: OTOLARYNGOLOGY

## 2023-10-18 RX ORDER — HYDROCODONE BITARTRATE AND ACETAMINOPHEN 5; 325 MG/1; MG/1
1 TABLET ORAL EVERY 6 HOURS PRN
Qty: 40 TABLET | Refills: 0 | Status: SHIPPED | OUTPATIENT
Start: 2023-10-18

## 2023-10-18 NOTE — PROGRESS NOTES
He is here for recheck after tonsillectomy. Denies fevers chills or other constitutional complaints. He is clearing his throat frequently. Physical exam: Right and left TMs appear normal.  Nose was patent. Oral cavity shows usual scabbing about the tonsil bed. No active sign of bleeding. Patient is doing well he was given a school excuse after surgery. He was given Norco pills instead of liquid. May gradually increase his activity and diet.

## 2023-10-19 ENCOUNTER — HOSPITAL ENCOUNTER (EMERGENCY)
Facility: HOSPITAL | Age: 23
Discharge: LEFT BEFORE DISCHARGE | End: 2023-10-19
Payer: MEDICAID

## 2023-10-20 ENCOUNTER — HOSPITAL ENCOUNTER (EMERGENCY)
Facility: HOSPITAL | Age: 23
Discharge: HOME | End: 2023-10-21
Attending: EMERGENCY MEDICINE
Payer: MEDICAID

## 2023-10-20 DIAGNOSIS — J35.8 TONSILLAR BLEED: Primary | ICD-10-CM

## 2023-10-21 ENCOUNTER — ANESTHESIA EVENT (OUTPATIENT)
Dept: SURGERY | Facility: HOSPITAL | Age: 23
End: 2023-10-21
Payer: MEDICAID

## 2023-10-21 ENCOUNTER — ANESTHESIA (OUTPATIENT)
Dept: SURGERY | Facility: HOSPITAL | Age: 23
End: 2023-10-21
Payer: MEDICAID

## 2023-10-21 VITALS
HEART RATE: 94 BPM | WEIGHT: 135 LBS | HEIGHT: 70 IN | RESPIRATION RATE: 14 BRPM | BODY MASS INDEX: 19.33 KG/M2 | TEMPERATURE: 98 F | SYSTOLIC BLOOD PRESSURE: 113 MMHG | OXYGEN SATURATION: 100 % | DIASTOLIC BLOOD PRESSURE: 74 MMHG

## 2023-10-21 PROBLEM — J35.8 TONSILLAR BLEED: Status: ACTIVE | Noted: 2023-10-21

## 2023-10-21 LAB
ALBUMIN SERPL-MCNC: 4 G/DL (ref 3.4–5)
ALBUMIN/GLOB SERPL: 1.1 {RATIO} (ref 1–2)
ALP LIVER SERPL-CCNC: 57 U/L
ALT SERPL-CCNC: 22 U/L
ANION GAP SERPL CALC-SCNC: 6 MMOL/L (ref 0–18)
AST SERPL-CCNC: 19 U/L (ref 15–37)
BASOPHILS # BLD AUTO: 0.05 X10(3) UL (ref 0–0.2)
BASOPHILS NFR BLD AUTO: 0.6 %
BILIRUB SERPL-MCNC: 1 MG/DL (ref 0.1–2)
BUN BLD-MCNC: 12 MG/DL (ref 7–18)
CALCIUM BLD-MCNC: 9.4 MG/DL (ref 8.5–10.1)
CHLORIDE SERPL-SCNC: 104 MMOL/L (ref 98–112)
CO2 SERPL-SCNC: 26 MMOL/L (ref 21–32)
CREAT BLD-MCNC: 1.11 MG/DL
EGFRCR SERPLBLD CKD-EPI 2021: 97 ML/MIN/1.73M2 (ref 60–?)
EOSINOPHIL # BLD AUTO: 0.15 X10(3) UL (ref 0–0.7)
EOSINOPHIL NFR BLD AUTO: 1.7 %
ERYTHROCYTE [DISTWIDTH] IN BLOOD BY AUTOMATED COUNT: 12.3 %
GLOBULIN PLAS-MCNC: 3.6 G/DL (ref 2.8–4.4)
GLUCOSE BLD-MCNC: 116 MG/DL (ref 70–99)
HCT VFR BLD AUTO: 44.1 %
HGB BLD-MCNC: 15 G/DL
IMM GRANULOCYTES # BLD AUTO: 0.02 X10(3) UL (ref 0–1)
IMM GRANULOCYTES NFR BLD: 0.2 %
LYMPHOCYTES # BLD AUTO: 2.94 X10(3) UL (ref 1–4)
LYMPHOCYTES NFR BLD AUTO: 33.1 %
MCH RBC QN AUTO: 29 PG (ref 26–34)
MCHC RBC AUTO-ENTMCNC: 34 G/DL (ref 31–37)
MCV RBC AUTO: 85.1 FL
MONOCYTES # BLD AUTO: 0.81 X10(3) UL (ref 0.1–1)
MONOCYTES NFR BLD AUTO: 9.1 %
NEUTROPHILS # BLD AUTO: 4.91 X10 (3) UL (ref 1.5–7.7)
NEUTROPHILS # BLD AUTO: 4.91 X10(3) UL (ref 1.5–7.7)
NEUTROPHILS NFR BLD AUTO: 55.3 %
OSMOLALITY SERPL CALC.SUM OF ELEC: 283 MOSM/KG (ref 275–295)
PLATELET # BLD AUTO: 431 10(3)UL (ref 150–450)
POTASSIUM SERPL-SCNC: 4 MMOL/L (ref 3.5–5.1)
PROT SERPL-MCNC: 7.6 G/DL (ref 6.4–8.2)
RBC # BLD AUTO: 5.18 X10(6)UL
SODIUM SERPL-SCNC: 136 MMOL/L (ref 136–145)
WBC # BLD AUTO: 8.9 X10(3) UL (ref 4–11)

## 2023-10-21 PROCEDURE — 0W33XZZ CONTROL BLEEDING IN ORAL CAVITY AND THROAT, EXTERNAL APPROACH: ICD-10-PCS | Performed by: OTOLARYNGOLOGY

## 2023-10-21 PROCEDURE — 42960 CONTROL THROAT BLEEDING: CPT | Performed by: OTOLARYNGOLOGY

## 2023-10-21 RX ORDER — HYDROMORPHONE HYDROCHLORIDE 1 MG/ML
0.6 INJECTION, SOLUTION INTRAMUSCULAR; INTRAVENOUS; SUBCUTANEOUS EVERY 5 MIN PRN
Status: DISCONTINUED | OUTPATIENT
Start: 2023-10-21 | End: 2023-10-21

## 2023-10-21 RX ORDER — SODIUM CHLORIDE, SODIUM LACTATE, POTASSIUM CHLORIDE, CALCIUM CHLORIDE 600; 310; 30; 20 MG/100ML; MG/100ML; MG/100ML; MG/100ML
INJECTION, SOLUTION INTRAVENOUS CONTINUOUS
Status: DISCONTINUED | OUTPATIENT
Start: 2023-10-21 | End: 2023-10-21

## 2023-10-21 RX ORDER — ESMOLOL HYDROCHLORIDE 10 MG/ML
INJECTION INTRAVENOUS AS NEEDED
Status: DISCONTINUED | OUTPATIENT
Start: 2023-10-21 | End: 2023-10-21 | Stop reason: SURG

## 2023-10-21 RX ORDER — NALOXONE HYDROCHLORIDE 0.4 MG/ML
0.08 INJECTION, SOLUTION INTRAMUSCULAR; INTRAVENOUS; SUBCUTANEOUS AS NEEDED
Status: DISCONTINUED | OUTPATIENT
Start: 2023-10-21 | End: 2023-10-21

## 2023-10-21 RX ORDER — PROCHLORPERAZINE EDISYLATE 5 MG/ML
5 INJECTION INTRAMUSCULAR; INTRAVENOUS EVERY 8 HOURS PRN
Status: DISCONTINUED | OUTPATIENT
Start: 2023-10-21 | End: 2023-10-21

## 2023-10-21 RX ORDER — ONDANSETRON 2 MG/ML
4 INJECTION INTRAMUSCULAR; INTRAVENOUS EVERY 6 HOURS PRN
Status: DISCONTINUED | OUTPATIENT
Start: 2023-10-21 | End: 2023-10-21

## 2023-10-21 RX ORDER — ACETAMINOPHEN 500 MG
1000 TABLET ORAL ONCE AS NEEDED
Status: DISCONTINUED | OUTPATIENT
Start: 2023-10-21 | End: 2023-10-21

## 2023-10-21 RX ORDER — HYDROMORPHONE HYDROCHLORIDE 1 MG/ML
0.4 INJECTION, SOLUTION INTRAMUSCULAR; INTRAVENOUS; SUBCUTANEOUS EVERY 5 MIN PRN
Status: DISCONTINUED | OUTPATIENT
Start: 2023-10-21 | End: 2023-10-21

## 2023-10-21 RX ORDER — DEXAMETHASONE SODIUM PHOSPHATE 4 MG/ML
VIAL (ML) INJECTION AS NEEDED
Status: DISCONTINUED | OUTPATIENT
Start: 2023-10-21 | End: 2023-10-21 | Stop reason: SURG

## 2023-10-21 RX ORDER — LIDOCAINE HYDROCHLORIDE 10 MG/ML
INJECTION, SOLUTION EPIDURAL; INFILTRATION; INTRACAUDAL; PERINEURAL AS NEEDED
Status: DISCONTINUED | OUTPATIENT
Start: 2023-10-21 | End: 2023-10-21 | Stop reason: SURG

## 2023-10-21 RX ORDER — BUPIVACAINE HYDROCHLORIDE AND EPINEPHRINE 2.5; 5 MG/ML; UG/ML
INJECTION, SOLUTION EPIDURAL; INFILTRATION; INTRACAUDAL; PERINEURAL AS NEEDED
Status: DISCONTINUED | OUTPATIENT
Start: 2023-10-21 | End: 2023-10-21 | Stop reason: HOSPADM

## 2023-10-21 RX ORDER — HYDROCODONE BITARTRATE AND ACETAMINOPHEN 5; 325 MG/1; MG/1
2 TABLET ORAL ONCE AS NEEDED
Status: DISCONTINUED | OUTPATIENT
Start: 2023-10-21 | End: 2023-10-21

## 2023-10-21 RX ORDER — ONDANSETRON 2 MG/ML
INJECTION INTRAMUSCULAR; INTRAVENOUS AS NEEDED
Status: DISCONTINUED | OUTPATIENT
Start: 2023-10-21 | End: 2023-10-21 | Stop reason: SURG

## 2023-10-21 RX ORDER — SODIUM CHLORIDE, SODIUM LACTATE, POTASSIUM CHLORIDE, CALCIUM CHLORIDE 600; 310; 30; 20 MG/100ML; MG/100ML; MG/100ML; MG/100ML
INJECTION, SOLUTION INTRAVENOUS CONTINUOUS PRN
Status: DISCONTINUED | OUTPATIENT
Start: 2023-10-21 | End: 2023-10-21 | Stop reason: SURG

## 2023-10-21 RX ORDER — PHENYLEPHRINE HCL 10 MG/ML
VIAL (ML) INJECTION AS NEEDED
Status: DISCONTINUED | OUTPATIENT
Start: 2023-10-21 | End: 2023-10-21 | Stop reason: SURG

## 2023-10-21 RX ORDER — HYDROMORPHONE HYDROCHLORIDE 1 MG/ML
0.2 INJECTION, SOLUTION INTRAMUSCULAR; INTRAVENOUS; SUBCUTANEOUS EVERY 5 MIN PRN
Status: DISCONTINUED | OUTPATIENT
Start: 2023-10-21 | End: 2023-10-21

## 2023-10-21 RX ORDER — HYDROCODONE BITARTRATE AND ACETAMINOPHEN 5; 325 MG/1; MG/1
1 TABLET ORAL ONCE AS NEEDED
Status: DISCONTINUED | OUTPATIENT
Start: 2023-10-21 | End: 2023-10-21

## 2023-10-21 RX ADMIN — SODIUM CHLORIDE, SODIUM LACTATE, POTASSIUM CHLORIDE, CALCIUM CHLORIDE: 600; 310; 30; 20 INJECTION, SOLUTION INTRAVENOUS at 03:16:00

## 2023-10-21 RX ADMIN — ESMOLOL HYDROCHLORIDE 30 MG: 10 INJECTION INTRAVENOUS at 03:20:00

## 2023-10-21 RX ADMIN — PHENYLEPHRINE HCL 100 MCG: 10 MG/ML VIAL (ML) INJECTION at 03:37:00

## 2023-10-21 RX ADMIN — DEXAMETHASONE SODIUM PHOSPHATE 8 MG: 4 MG/ML VIAL (ML) INJECTION at 03:27:00

## 2023-10-21 RX ADMIN — ONDANSETRON 4 MG: 2 INJECTION INTRAMUSCULAR; INTRAVENOUS at 03:27:00

## 2023-10-21 RX ADMIN — LIDOCAINE HYDROCHLORIDE 60 MG: 10 INJECTION, SOLUTION EPIDURAL; INFILTRATION; INTRACAUDAL; PERINEURAL at 03:20:00

## 2023-10-21 RX ADMIN — PHENYLEPHRINE HCL 100 MCG: 10 MG/ML VIAL (ML) INJECTION at 03:48:00

## 2023-10-21 RX ADMIN — PHENYLEPHRINE HCL 100 MCG: 10 MG/ML VIAL (ML) INJECTION at 03:43:00

## 2023-10-21 NOTE — ANESTHESIA POSTPROCEDURE EVALUATION
[address] Patient Status:  Emergency   Age/Gender 26year old male MRN [id number]   Location [facility] Attending Theodore Rivera MD   Hosp Day # 0 PCP Ángela Bell MD       Anesthesia Post-op Note    CONTROL OF BLEEDERS POST-TONSILLECTOMY    Procedure Summary       Date: 10/21/23 Room / Location: [facility] OR 03 / [facility]4 [facility] OR    Anesthesia Start: 0316 Anesthesia Stop: 4644    Procedure: CONTROL OF BLEEDERS POST-TONSILLECTOMY Diagnosis:       Status post tonsillectomy      (Status post tonsillectomy [Z90.89])    Surgeons: Theodore Rivera MD Anesthesiologist: Annabella Jolley MD    Anesthesia Type: regional ASA Status: 1            Anesthesia Type: regional    Vitals Value Taken Time   /68 10/21/23 0401   Temp 97.8 10/21/23 0406   Pulse 103 10/21/23 0405   Resp 16 10/21/23 0405   SpO2 100 % 10/21/23 0405   Vitals shown include unfiled device data. Patient Location: PACU    Anesthesia Type: general    Airway Patency: patent and extubated    Postop Pain Control: adequate    Mental Status: preanesthetic baseline    Nausea/Vomiting: none    Cardiopulmonary/Hydration status: stable euvolemic    Complications: no apparent anesthesia related complications    Postop vital signs: stable    Dental Exam: Unchanged from Preop    Patient to be discharged from PACU when criteria met.

## 2023-10-21 NOTE — ED QUICK NOTES
Assumed care of pt from [address] [place] [address]. Pt arrives comfortable. C/o discomfort to back of throat and feeling \"a large clot\" in the throat. Pt speaking in full, clear sentences and managing secretions at this time.  No bleeding noted on arrival.

## 2023-10-21 NOTE — ED INITIAL ASSESSMENT (HPI)
Pt to ed with c/o vomiting blood s/p tonsillectomy on 10/13. States pt was cleared on Wednesday. Vomiting blood started yesterday and had been more constant since 1600. Pt states \"there is a wound that keeps opening and closing. \"

## 2023-10-21 NOTE — ANESTHESIA PROCEDURE NOTES
Airway  Date/Time: 10/21/2023 3:23 AM  Urgency: elective    Airway not difficult    General Information and Staff    Patient location during procedure: OR  Anesthesiologist: Nandini Greenberg MD  Performed: anesthesiologist   Performed by: Nandini Greenberg MD  Authorized by: Nandini Greenberg MD      Indications and Patient Condition  Indications for airway management: anesthesia  Spontaneous Ventilation: absent  Sedation level: deep  Preoxygenated: yes  Patient position: sniffing  Mask difficulty assessment: 0 - not attempted    Final Airway Details  Final airway type: endotracheal airway      Successful airway: ETT  Cuffed: yes   Successful intubation technique: direct laryngoscopy  Facilitating devices/methods: intubating stylet  Endotracheal tube insertion site: oral  Blade: Rich  Blade size: #4  ETT size (mm): 7.5    Cormack-Lehane Classification: grade IIB - view of arytenoids or posterior of glottis only  Placement verified by: capnometry   Cuff volume (mL): 7  Measured from: lips  ETT to lips (cm): 23  Number of attempts at approach: 1  Number of other approaches attempted: 0

## 2023-10-21 NOTE — OPERATIVE REPORT
[facility]  Op Note    Arvella Main Location: OR   CSN [id number] MRN [id number]   Admission Date 10/20/2023 Operation Date 10/21/2023   Attending Physician Ether Simmonds, MD Operating Physician Elana Tillman MD     Pre-Operative Diagnosis: Status post tonsillectomy [Z90.89]    Post-Operative Diagnosis: Same as above    The patient is status post tonsillectomy. I instructed the patient and his mother of the risk benefits and alternatives to this procedure. The risks are to include but not limited to bleeding along with need for further surgery, serious bleeding and pain. Procedure Performed: Procedure(s):  CONTROL OF BLEEDERS POST-TONSILLECTOMY    Surgeon: Surgeon(s):  Ether Simmonds, MD     Anesthesia: General        Summary of Case: After satisfactory general endotracheal anesthesia induction the patient was prepared for the procedure. The Linn Edis Paz was placed. The left tonsil bed was evaluated and noted to be normal.  There was clot at the right tonsil bed. I suctioned the clot from the right tonsil bed. The patient was noted to have a bleeding vessel inferiorly. Suction Bovie was utilized to control the bleeding. The mouthgag was let down. After a few minutes the mouthgag was reexpanded. There is no further signs of bleeding. 1/4% Marcaine with epinephrine was injected at the right tonsil pillar. An NG tube was placed into the stomach to suction out any stomach contents. The mouthgag was removed. Patient was awoken extubated and transferred recovery room in stable condition.     Elana Tillman MD  10/21/2023  4:00 AM

## 2023-10-25 ENCOUNTER — OFFICE VISIT (OUTPATIENT)
Facility: LOCATION | Age: 23
End: 2023-10-25

## 2023-10-25 DIAGNOSIS — J35.1 CHRONIC TONSILLAR HYPERTROPHY: Primary | ICD-10-CM

## 2023-10-25 PROCEDURE — 99024 POSTOP FOLLOW-UP VISIT: CPT | Performed by: OTOLARYNGOLOGY

## 2023-10-25 NOTE — PROGRESS NOTES
He is postop control of post tonsillectomy bleeding on Friday. He is doing well. He has minimal pain. Exam reveals the tonsil beds to be healing well without signs of infection or any evidence of clot or bleeding. He will slowly advance his diet. He will refrain from physical exercise or singing till next Wednesday.

## 2023-10-31 DIAGNOSIS — F98.8 ATTENTION DEFICIT DISORDER (ADD) WITHOUT HYPERACTIVITY: ICD-10-CM

## 2023-11-01 RX ORDER — METHYLPHENIDATE HYDROCHLORIDE 10 MG/1
10 TABLET ORAL DAILY
Qty: 30 TABLET | Refills: 0 | OUTPATIENT
Start: 2023-11-01 | End: 2023-12-01

## 2023-11-01 RX ORDER — DEXTROAMPHETAMINE SACCHARATE, AMPHETAMINE ASPARTATE MONOHYDRATE, DEXTROAMPHETAMINE SULFATE AND AMPHETAMINE SULFATE 7.5; 7.5; 7.5; 7.5 MG/1; MG/1; MG/1; MG/1
30 CAPSULE, EXTENDED RELEASE ORAL DAILY
Qty: 30 CAPSULE | Refills: 0 | OUTPATIENT
Start: 2023-11-01 | End: 2023-12-01

## 2023-11-05 DIAGNOSIS — F98.8 ATTENTION DEFICIT DISORDER (ADD) WITHOUT HYPERACTIVITY: ICD-10-CM

## 2023-11-06 RX ORDER — METHYLPHENIDATE HYDROCHLORIDE 10 MG/1
10 TABLET ORAL DAILY
Qty: 30 TABLET | Refills: 0 | Status: SHIPPED | OUTPATIENT
Start: 2023-11-06 | End: 2023-12-06

## 2023-11-06 RX ORDER — DEXTROAMPHETAMINE SACCHARATE, AMPHETAMINE ASPARTATE MONOHYDRATE, DEXTROAMPHETAMINE SULFATE AND AMPHETAMINE SULFATE 7.5; 7.5; 7.5; 7.5 MG/1; MG/1; MG/1; MG/1
30 CAPSULE, EXTENDED RELEASE ORAL DAILY
Qty: 30 CAPSULE | Refills: 0 | Status: SHIPPED | OUTPATIENT
Start: 2023-11-06 | End: 2023-12-06

## 2023-11-06 NOTE — TELEPHONE ENCOUNTER
A refill request was received for:  Requested Prescriptions     Pending Prescriptions Disp Refills    amphetamine-dextroamphetamine ER (ADDERALL XR) 30 MG Oral Capsule SR 24 Hr 30 capsule 0     Sig: Take 1 capsule (30 mg total) by mouth daily. methylphenidate (RITALIN) 10 MG Oral Tab 30 tablet 0     Sig: Take 1 tablet (10 mg total) by mouth daily.        Last refill date:10/[id number]       Last office visit: 8/1/2023    Follow up due:  Future Appointments   Date Time Provider Jose J León   11/16/2023  9:30 AM Ana Garibay MD [id number] [address]   11/18/2023 11:30 AM Alee López MD [facility] [address]

## 2023-11-18 PROBLEM — F31.60 BIPOLAR AFFECTIVE, MIXED (HCC): Status: ACTIVE | Noted: 2023-11-18

## 2023-11-22 ENCOUNTER — TELEPHONE (OUTPATIENT)
Dept: ORTHOPEDICS CLINIC | Facility: CLINIC | Age: 23
End: 2023-11-22

## 2023-11-22 DIAGNOSIS — M79.671 BILATERAL FOOT PAIN: Primary | ICD-10-CM

## 2023-11-22 DIAGNOSIS — M79.672 BILATERAL FOOT PAIN: Primary | ICD-10-CM

## 2023-11-22 NOTE — TELEPHONE ENCOUNTER
Future Appointments   Date Time Provider Jose J León   12/12/2023  1:30 PM Kody Spain., DPM [facility]       This patient is coming for FRED Feet goes out. No prior imaging done yet. Please advise if views are needed for this appt. Thanks.     Mom may be reached at [phone]

## 2023-12-12 ENCOUNTER — HOSPITAL ENCOUNTER (OUTPATIENT)
Dept: GENERAL RADIOLOGY | Age: 23
Discharge: HOME | End: 2023-12-12
Attending: PODIATRIST
Payer: MEDICAID

## 2023-12-12 ENCOUNTER — OFFICE VISIT (OUTPATIENT)
Dept: ORTHOPEDICS CLINIC | Facility: CLINIC | Age: 23
End: 2023-12-12
Payer: MEDICAID

## 2023-12-12 ENCOUNTER — TELEPHONE (OUTPATIENT)
Dept: ORTHOPEDICS CLINIC | Facility: CLINIC | Age: 23
End: 2023-12-12

## 2023-12-12 VITALS — HEIGHT: 69.5 IN | BODY MASS INDEX: 19.11 KG/M2 | WEIGHT: 132 LBS

## 2023-12-12 DIAGNOSIS — M25.561 CHRONIC PAIN OF BOTH KNEES: ICD-10-CM

## 2023-12-12 DIAGNOSIS — M79.672 BILATERAL FOOT PAIN: ICD-10-CM

## 2023-12-12 DIAGNOSIS — M21.41 PES PLANUS OF BOTH FEET: ICD-10-CM

## 2023-12-12 DIAGNOSIS — G89.29 CHRONIC PAIN OF BOTH KNEES: ICD-10-CM

## 2023-12-12 DIAGNOSIS — M54.9 UPPER BACK PAIN: Primary | ICD-10-CM

## 2023-12-12 DIAGNOSIS — M79.671 BILATERAL FOOT PAIN: ICD-10-CM

## 2023-12-12 DIAGNOSIS — R26.9 GAIT ABNORMALITY: Primary | ICD-10-CM

## 2023-12-12 DIAGNOSIS — M21.619 BUNION: ICD-10-CM

## 2023-12-12 DIAGNOSIS — M25.562 CHRONIC PAIN OF BOTH KNEES: ICD-10-CM

## 2023-12-12 DIAGNOSIS — M21.42 PES PLANUS OF BOTH FEET: ICD-10-CM

## 2023-12-12 DIAGNOSIS — R76.8 RHEUMATOID FACTOR POSITIVE: ICD-10-CM

## 2023-12-12 DIAGNOSIS — M21.079 ACQUIRED VALGUS DEFORMITY OF ANKLE: ICD-10-CM

## 2023-12-12 DIAGNOSIS — M24.9 HYPERMOBILE JOINTS: ICD-10-CM

## 2023-12-12 PROCEDURE — 3008F BODY MASS INDEX DOCD: CPT | Performed by: PODIATRIST

## 2023-12-12 PROCEDURE — 99204 OFFICE O/P NEW MOD 45 MIN: CPT | Performed by: PODIATRIST

## 2023-12-12 PROCEDURE — 73630 X-RAY EXAM OF FOOT: CPT | Performed by: PODIATRIST

## 2023-12-12 RX ORDER — METHYLPHENIDATE HYDROCHLORIDE 10 MG/1
10 TABLET ORAL AS NEEDED
COMMUNITY
End: 2023-12-18 | Stop reason: DRUGHIGH

## 2023-12-12 RX ORDER — DEXTROAMPHETAMINE SACCHARATE, AMPHETAMINE ASPARTATE, DEXTROAMPHETAMINE SULFATE AND AMPHETAMINE SULFATE 5; 5; 5; 5 MG/1; MG/1; MG/1; MG/1
20 TABLET ORAL AS NEEDED
COMMUNITY
End: 2023-12-18 | Stop reason: DRUGHIGH

## 2023-12-12 NOTE — TELEPHONE ENCOUNTER
Patient scheduled with Dr. Mckeon for upper back. No recent imaging in epic. Patient aware to arrive early for xray.    Future Appointments   Date Time Provider Department Center   12/18/2023  1:00 PM Meño López MD [facility] [address]   12/18/2023  2:40 PM Terrell Mckeon MD [facility]

## 2023-12-18 ENCOUNTER — OFFICE VISIT (OUTPATIENT)
Dept: ORTHOPEDICS CLINIC | Facility: CLINIC | Age: 23
End: 2023-12-18
Payer: MEDICAID

## 2023-12-18 ENCOUNTER — HOSPITAL ENCOUNTER (OUTPATIENT)
Dept: GENERAL RADIOLOGY | Age: 23
Discharge: HOME | End: 2023-12-18
Attending: STUDENT IN AN ORGANIZED HEALTH CARE EDUCATION/TRAINING PROGRAM
Payer: MEDICAID

## 2023-12-18 DIAGNOSIS — M54.6 CHRONIC MIDLINE THORACIC BACK PAIN: Primary | ICD-10-CM

## 2023-12-18 DIAGNOSIS — M54.9 UPPER BACK PAIN: ICD-10-CM

## 2023-12-18 DIAGNOSIS — G89.29 CHRONIC MIDLINE THORACIC BACK PAIN: Primary | ICD-10-CM

## 2023-12-18 PROCEDURE — 99203 OFFICE O/P NEW LOW 30 MIN: CPT | Performed by: STUDENT IN AN ORGANIZED HEALTH CARE EDUCATION/TRAINING PROGRAM

## 2023-12-18 PROCEDURE — 72050 X-RAY EXAM NECK SPINE 4/5VWS: CPT | Performed by: STUDENT IN AN ORGANIZED HEALTH CARE EDUCATION/TRAINING PROGRAM

## 2024-03-17 DIAGNOSIS — F98.8 ATTENTION DEFICIT DISORDER (ADD) WITHOUT HYPERACTIVITY: ICD-10-CM

## 2024-03-18 RX ORDER — METHYLPHENIDATE HYDROCHLORIDE 10 MG/1
10 TABLET ORAL AS NEEDED
Qty: 30 TABLET | Refills: 0 | Status: SHIPPED | OUTPATIENT
Start: 2024-03-18

## 2024-03-18 RX ORDER — DEXTROAMPHETAMINE SACCHARATE, AMPHETAMINE ASPARTATE MONOHYDRATE, DEXTROAMPHETAMINE SULFATE AND AMPHETAMINE SULFATE 3.75; 3.75; 3.75; 3.75 MG/1; MG/1; MG/1; MG/1
15 CAPSULE, EXTENDED RELEASE ORAL DAILY
Qty: 30 CAPSULE | Refills: 0 | Status: SHIPPED | OUTPATIENT
Start: 2024-03-18 | End: 2024-04-17

## 2024-03-18 NOTE — TELEPHONE ENCOUNTER
A refill request was received for:  Requested Prescriptions     Pending Prescriptions Disp Refills    Amphetamine-Dextroamphet ER (ADDERALL XR) 15 MG Oral Capsule SR 24 Hr 30 capsule 0     Sig: Take 1 capsule (15 mg total) by mouth daily. Fill prescription in 61 days.    methylphenidate 10 MG Oral Tab 30 tablet 0     Sig: Take 1 tablet (10 mg total) by mouth as needed (a couple times a week in afternoon).       Last refill date:methylphenidate 12/2023  Amphetamine 2/18/2024       Last office visit:12/18/2023     Follow up due:  Future Appointments   Date Time Provider Department Center   3/23/2024 10:00 AM Meño López MD [facility] [address]

## 2024-03-24 DIAGNOSIS — F31.60 BIPOLAR AFFECTIVE, MIXED (HCC): ICD-10-CM

## 2024-03-25 RX ORDER — QUETIAPINE FUMARATE 50 MG/1
100 TABLET, EXTENDED RELEASE ORAL NIGHTLY
Qty: 60 TABLET | Refills: 2 | Status: SHIPPED | OUTPATIENT
Start: 2024-03-25

## 2024-03-25 NOTE — TELEPHONE ENCOUNTER
.A refill request was received for:  Requested Prescriptions     Pending Prescriptions Disp Refills    QUETIAPINE ER 50 MG Oral Tablet 24 Hr [Pharmacy Med Name: QUETIAPINE 50MG ER TABLETS] 60 tablet 2     Sig: TAKE 2 TABLETS(100 MG) BY MOUTH EVERY NIGHT       Last refill date:   12/18/2023    Last office visit: 12/18/2023    Follow up due:  Future Appointments   Date Time Provider Department Center   3/28/2024  4:30 PM Meño López MD [facility] [address]

## 2024-03-28 PROBLEM — J35.8 TONSILLAR BLEED: Status: RESOLVED | Noted: 2023-10-21 | Resolved: 2024-03-28

## 2024-03-28 PROBLEM — J35.01 CHRONIC TONSILLITIS: Status: RESOLVED | Noted: 2023-02-08 | Resolved: 2024-03-28

## 2024-04-01 ENCOUNTER — TELEPHONE (OUTPATIENT)
Dept: FAMILY MEDICINE CLINIC | Facility: CLINIC | Age: 24
End: 2024-04-01

## 2024-04-01 NOTE — TELEPHONE ENCOUNTER
Insurance will not cover Quetiapine ER 50 mh TID   Pharmacy/insurance Wants a new script for Quetiapine  mg 1/day instead

## 2024-04-02 RX ORDER — QUETIAPINE 150 MG/1
150 TABLET, FILM COATED, EXTENDED RELEASE ORAL NIGHTLY
Qty: 30 TABLET | Refills: 0 | Status: SHIPPED | OUTPATIENT
Start: 2024-04-02 | End: 2024-05-02

## 2024-04-09 ENCOUNTER — TELEPHONE (OUTPATIENT)
Dept: PHYSICAL THERAPY | Facility: HOSPITAL | Age: 24
End: 2024-04-09

## 2024-05-03 ENCOUNTER — DOCUMENTATION ONLY (OUTPATIENT)
Dept: FAMILY MEDICINE CLINIC | Facility: CLINIC | Age: 24
End: 2024-05-03

## 2024-05-03 NOTE — PROGRESS NOTES
Prior authorization approved  Payer: [employer] Case ID: [id number]    [phone]  Note from payer: The case has been Approved from  [id number] to [id number]  Approval Details    Authorized from May 25, 2024 to May 25, 2025    Adderall approved

## 2024-05-03 NOTE — TELEPHONE ENCOUNTER
.A refill request was received for:  Requested Prescriptions     Pending Prescriptions Disp Refills    QUETIAPINE  MG Oral Tablet 24 Hr [Pharmacy Med Name: QUETIAPINE ER 150MG TABLETS] 30 tablet 0     Sig: TAKE 1 TABLET(150 MG) BY MOUTH EVERY NIGHT       Last refill date:   4/2/2024    Last office visit: 5/2/2024    Follow up due:  No future appointments.

## 2024-05-05 RX ORDER — QUETIAPINE 150 MG/1
150 TABLET, FILM COATED, EXTENDED RELEASE ORAL NIGHTLY
Qty: 30 TABLET | Refills: 0 | Status: SHIPPED | OUTPATIENT
Start: 2024-05-05

## 2024-06-10 RX ORDER — QUETIAPINE 150 MG/1
150 TABLET, FILM COATED, EXTENDED RELEASE ORAL NIGHTLY
Qty: 30 TABLET | Refills: 0 | Status: SHIPPED | OUTPATIENT
Start: 2024-06-10

## 2024-06-10 NOTE — TELEPHONE ENCOUNTER
A refill request was received for:  Requested Prescriptions     Pending Prescriptions Disp Refills    QUETIAPINE  MG Oral Tablet 24 Hr [Pharmacy Med Name: QUETIAPINE ER 150MG TABLETS] 30 tablet 0     Sig: TAKE 1 TABLET(150 MG) BY MOUTH EVERY NIGHT       Last refill date:5/5/2024       Last office visit:5/2/2024     Follow up due:  No future appointments.

## 2024-06-30 DIAGNOSIS — F31.60 BIPOLAR AFFECTIVE, MIXED (HCC): ICD-10-CM

## 2024-07-01 RX ORDER — QUETIAPINE FUMARATE 50 MG/1
100 TABLET, EXTENDED RELEASE ORAL NIGHTLY
Qty: 60 TABLET | Refills: 0 | Status: SHIPPED | OUTPATIENT
Start: 2024-07-01

## 2024-07-01 NOTE — TELEPHONE ENCOUNTER
A refill request was received for:  Requested Prescriptions     Pending Prescriptions Disp Refills    QUETIAPINE ER 50 MG Oral Tablet 24 Hr [Pharmacy Med Name: QUETIAPINE 50MG ER TABLETS] 60 tablet 0     Sig: TAKE 2 TABLETS(100 MG) BY MOUTH EVERY NIGHT       Last refill date:6/10/2024       Last office visit:5/2/2024 telemed      Follow up due:  No future appointments.

## 2024-07-08 RX ORDER — QUETIAPINE 150 MG/1
150 TABLET, FILM COATED, EXTENDED RELEASE ORAL NIGHTLY
Qty: 30 TABLET | Refills: 0 | Status: SHIPPED | OUTPATIENT
Start: 2024-07-08

## 2024-07-08 NOTE — TELEPHONE ENCOUNTER
.A refill request was received for:  Requested Prescriptions     Pending Prescriptions Disp Refills    QUETIAPINE  MG Oral Tablet 24 Hr [Pharmacy Med Name: QUETIAPINE 150MG ER TABLETS] 30 tablet 0     Sig: TAKE 1 TABLET(150 MG) BY MOUTH EVERY NIGHT       Last refill date:   6/10/2024    Last office visit: 3/28/2024    Follow up due:  No future appointments.

## 2024-08-05 DIAGNOSIS — F31.60 BIPOLAR AFFECTIVE, MIXED (HCC): ICD-10-CM

## 2024-08-05 DIAGNOSIS — F98.8 ATTENTION DEFICIT DISORDER (ADD) WITHOUT HYPERACTIVITY: ICD-10-CM

## 2024-08-05 RX ORDER — QUETIAPINE 150 MG/1
150 TABLET, FILM COATED, EXTENDED RELEASE ORAL NIGHTLY
Qty: 30 TABLET | Refills: 0 | OUTPATIENT
Start: 2024-08-05

## 2024-08-05 RX ORDER — QUETIAPINE FUMARATE 50 MG/1
100 TABLET, EXTENDED RELEASE ORAL NIGHTLY
Qty: 60 TABLET | Refills: 0 | Status: SHIPPED | OUTPATIENT
Start: 2024-08-05

## 2024-08-05 RX ORDER — QUETIAPINE 150 MG/1
150 TABLET, FILM COATED, EXTENDED RELEASE ORAL NIGHTLY
Qty: 30 TABLET | Refills: 0 | Status: SHIPPED | OUTPATIENT
Start: 2024-08-05

## 2024-08-05 RX ORDER — METHYLPHENIDATE HYDROCHLORIDE 10 MG/1
10 TABLET ORAL AS NEEDED
Qty: 30 TABLET | Refills: 0 | Status: SHIPPED | OUTPATIENT
Start: 2024-08-05

## 2024-08-05 RX ORDER — DEXTROAMPHETAMINE SACCHARATE, AMPHETAMINE ASPARTATE MONOHYDRATE, DEXTROAMPHETAMINE SULFATE AND AMPHETAMINE SULFATE 2.5; 2.5; 2.5; 2.5 MG/1; MG/1; MG/1; MG/1
10 CAPSULE, EXTENDED RELEASE ORAL DAILY
Qty: 30 CAPSULE | Refills: 0 | Status: SHIPPED | OUTPATIENT
Start: 2024-08-05 | End: 2024-09-04

## 2024-08-05 NOTE — TELEPHONE ENCOUNTER
A refill request was received for:  Requested Prescriptions     Pending Prescriptions Disp Refills    methylphenidate 10 MG Oral Tab 30 tablet 0     Sig: Take 1 tablet (10 mg total) by mouth as needed (a couple times a week in afternoon).    amphetamine-dextroamphetamine ER (ADDERALL XR) 10 MG Oral Capsule SR 24 Hr 30 capsule 0     Sig: Take 1 capsule (10 mg total) by mouth daily.       Last refill date: METHYLPHENIDATE 3/2024  ADDERALL 7/6/2024      Last office visit: 3/28/2024    Follow up due:  No future appointments.

## 2024-08-05 NOTE — TELEPHONE ENCOUNTER
.A refill request was received for:  Requested Prescriptions     Pending Prescriptions Disp Refills    QUETIAPINE  MG Oral Tablet 24 Hr [Pharmacy Med Name: QUETIAPINE 150MG ER TABLETS] 30 tablet 0     Sig: TAKE 1 TABLET(150 MG) BY MOUTH EVERY NIGHT       Last refill date:   7/8/2024    Last office visit: 3/28/2024    Follow up due:  No future appointments.

## 2024-08-05 NOTE — TELEPHONE ENCOUNTER
A refill request was received for:  Requested Prescriptions     Pending Prescriptions Disp Refills    QUETIAPINE ER 50 MG Oral Tablet 24 Hr [Pharmacy Med Name: QUETIAPINE 50MG ER TABLETS] 60 tablet 0     Sig: TAKE 2 TABLETS(100 MG) BY MOUTH EVERY NIGHT       Last refill date:       Last office visit:5/2/2024     Follow up due:  No future appointments.

## 2024-09-04 RX ORDER — QUETIAPINE 150 MG/1
150 TABLET, FILM COATED, EXTENDED RELEASE ORAL NIGHTLY
Qty: 30 TABLET | Refills: 0 | Status: SHIPPED | OUTPATIENT
Start: 2024-09-04

## 2024-09-04 NOTE — TELEPHONE ENCOUNTER
A refill request was received for:  Requested Prescriptions     Pending Prescriptions Disp Refills    QUEtiapine  MG Oral Tablet 24 Hr 30 tablet 0     Sig: Take 1 tablet (150 mg total) by mouth nightly.       Last refill date:   8/5/2024    Last office visit: 3/28/2024    Follow up due:  No future appointments.

## 2024-12-12 ENCOUNTER — OFFICE VISIT (OUTPATIENT)
Dept: FAMILY MEDICINE CLINIC | Facility: CLINIC | Age: 24
End: 2024-12-12
Payer: MEDICAID

## 2024-12-12 VITALS
HEIGHT: 69.5 IN | WEIGHT: 142 LBS | HEART RATE: 80 BPM | RESPIRATION RATE: 18 BRPM | OXYGEN SATURATION: 98 % | BODY MASS INDEX: 20.56 KG/M2 | SYSTOLIC BLOOD PRESSURE: 110 MMHG | DIASTOLIC BLOOD PRESSURE: 60 MMHG

## 2024-12-12 DIAGNOSIS — M79.605 CHRONIC PAIN OF BOTH LOWER EXTREMITIES: ICD-10-CM

## 2024-12-12 DIAGNOSIS — G89.29 CHRONIC PAIN OF BOTH LOWER EXTREMITIES: ICD-10-CM

## 2024-12-12 DIAGNOSIS — Z00.00 ANNUAL PHYSICAL EXAM: Primary | ICD-10-CM

## 2024-12-12 DIAGNOSIS — E55.9 VITAMIN D DEFICIENCY: ICD-10-CM

## 2024-12-12 DIAGNOSIS — R09.82 POST-NASAL DRIP: ICD-10-CM

## 2024-12-12 DIAGNOSIS — H93.8X2 EAR PRESSURE, LEFT: ICD-10-CM

## 2024-12-12 DIAGNOSIS — M79.604 CHRONIC PAIN OF BOTH LOWER EXTREMITIES: ICD-10-CM

## 2024-12-12 DIAGNOSIS — M54.50 CHRONIC BILATERAL LOW BACK PAIN WITHOUT SCIATICA: ICD-10-CM

## 2024-12-12 DIAGNOSIS — E53.8 B12 DEFICIENCY: ICD-10-CM

## 2024-12-12 DIAGNOSIS — G89.29 CHRONIC BILATERAL LOW BACK PAIN WITHOUT SCIATICA: ICD-10-CM

## 2024-12-12 PROCEDURE — 99395 PREV VISIT EST AGE 18-39: CPT | Performed by: FAMILY MEDICINE

## 2024-12-12 RX ORDER — PREDNISONE 20 MG/1
TABLET ORAL
Qty: 13 TABLET | Refills: 0 | Status: SHIPPED | OUTPATIENT
Start: 2024-12-12

## 2024-12-12 RX ORDER — DEXTROAMPHETAMINE SACCHARATE, AMPHETAMINE ASPARTATE MONOHYDRATE, DEXTROAMPHETAMINE SULFATE AND AMPHETAMINE SULFATE 5; 5; 5; 5 MG/1; MG/1; MG/1; MG/1
CAPSULE, EXTENDED RELEASE ORAL
COMMUNITY
Start: 2024-04-22

## 2024-12-12 NOTE — H&P
Oskar Martinez is a 24 year old male who presents for a complete physical exam.   HPI:     Pt complains of left ear pressure > 6 months.  + post nasal drip, mucus in throat, chronic cough.    Body feels frail and getting worse.  Muscle and joint pain.  Back pain.  + leg pain.  Legs are turned outword.  No radiation of pain.  No numbness or tingling.    Current Outpatient Medications   Medication Sig Dispense Refill    Amphetamine-Dextroamphet ER 20 MG Oral Capsule SR 24 Hr       predniSONE 20 MG Oral Tab 2.5 tab day 1-3, 2 tab day 4, 1.5 tab day 5, 1 tab day 6, 0.5 tab day 7 13 tablet 0    QUEtiapine  MG Oral Tablet 24 Hr Take 1 tablet (150 mg total) by mouth nightly. 30 tablet 0    QUETIAPINE ER 50 MG Oral Tablet 24 Hr TAKE 2 TABLETS(100 MG) BY MOUTH EVERY NIGHT 60 tablet 0    methylphenidate 10 MG Oral Tab Take 1 tablet (10 mg total) by mouth as needed (a couple times a week in afternoon). 30 tablet 0    cloNIDine 0.1 MG Oral Tab Take 1 tablet every day by oral route as needed.      cetirizine 10 MG Oral Tab as needed.        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    Seasonal allergies      Past Surgical History:   Procedure Laterality Date    Tonsillectomy        Family History   Problem Relation Age of Onset    Juvenile Rheumatoid Arthritis Mother       Social History:  Social History     Socioeconomic History    Marital status: Single   Tobacco Use    Smoking status: Never    Smokeless tobacco: Never   Vaping Use    Vaping status: Never Used   Substance and Sexual Activity    Alcohol use: Yes     Comment: occasional    Drug use: Yes     Types: Cannabis     Comment: daily- trying to cut down     Social Drivers of Health     Financial Resource Strain: Not on File (1/4/2024)    Received from [facility]     Financial Resource Strain     Financial Resource Strain: 0   Food Insecurity: Not on File (2024)    Received from [employer]    Food Insecurity     Food: 0   Transportation Needs: Not on File (2024)    [facility] from [facility]    Transportation Needs     Transportation: 0   Physical Activity: Not on File (2024)    Received from [facility]    Physical Activity     Physical Activity: 0   Stress: Not on File (2024)    Received from [facility]    Stress     Stress: 0   Social Connections: Not on File (9/15/2024)    Received from [employer]    Social Connections     Connectedness: 0   Housing Stability: Not on File (2024)    Received from [facility]    Housing Stability     Housin         REVIEW OF SYSTEMS:   GENERAL: feels well otherwise  SKIN: denies any unusual skin lesions  EYES:denies blurred vision or double vision  HEENT: denies nasal congestion, sinus pain or ST, denies changes in hearing or vision  LUNGS: denies shortness of breath with exertion or frequent cough  CV: denies chest pain, pressure or palpitations  GI: denies abdominal pain,denies heartburn, denies chronic diarrhea or constipation  : denies nocturia or changes in stream, denies erectile dysfunction  MS: denies back pain, arthralgias or myalgias  NEURO: denies headaches or dizziness  PSYCH: denies depression or anxiety  HEMATOLOGIC: denies hx of anemia, easy bruising or bleeding  ENDOCRINE:denies frequent thirst or urination, denies unintentional weight gain/loss  ALL/ASTHMA: denies hx of allergy or asthma    EXAM:   /60   Pulse 80   Resp 18   Ht 5' 9.5\" (1.765 m)   Wt 142 lb (64.4 kg)   SpO2 98%   BMI 20.67 kg/m²   Body mass index is 20.67 kg/m².     GENERAL: well developed, well nourished,in no apparent distress  SKIN: no rashes,no suspicious lesions  HEENT: atraumatic, normocephalic, PERRLA, conjunctiva clear, TMs clear, posterior pharynx clear, no congestion  NECK: supple,no adenopathy,no thyromegaly  LUNGS:  clear to auscultation, easy breathing  CV: S1S2, RRR without murmur  GI: good BS's;no masses, HSM or tenderness  : two descended testes,no scrotal tenderness or mass, normal penis no lesions, no hernia  MS: Karen, no bony deformities, + gait abnormal with legs turned outward with walking.  + low back b/l tenderness.  Not midline tenderness.  EXT: no cyanosis, clubbing or edema  NEURO: Oriented times three, strength 5/5 x 4 ext, LE DTRs 2+  PSYCH: mood and affect appropriate    ASSESSMENT AND PLAN:   Oskar Martinez is a 24 year old male who presents for a complete physical exam. Recommended heart healthy diet, routine exercise, and annual flu vaccines.  Routine testicular exams reinforced. The patient indicates understanding of these issues and agrees to the plan.    Follow up in 1 year.    1. Annual physical exam  - Lipid Panel; Future  - CBC With Differential With Platelet; Future  - Comp Metabolic Panel (14); Future  - TSH W Reflex To Free T4; Future  - Vitamin D; Future    2. Ear pressure, left  - predniSONE 20 MG Oral Tab; 2.5 tab day 1-3, 2 tab day 4, 1.5 tab day 5, 1 tab day 6, 0.5 tab day 7  Dispense: 13 tablet; Refill: 0    3. Post-nasal drip    4. Vitamin D deficiency  - Vitamin D; Future    5. B12 deficiency  - Vitamin B12 [E]; Future    6. Chronic bilateral low back pain without sciatica  - OP REFERRAL TO [facility]    7. Chronic pain of both lower extremities  - OP REFERRAL TO [facility]

## 2024-12-17 RX ORDER — QUETIAPINE 150 MG/1
150 TABLET, FILM COATED, EXTENDED RELEASE ORAL NIGHTLY
Qty: 30 TABLET | Refills: 2 | Status: SHIPPED | OUTPATIENT
Start: 2024-12-17 | End: 2025-02-04

## 2024-12-17 NOTE — TELEPHONE ENCOUNTER
A refill request was received for:  Requested Prescriptions     Pending Prescriptions Disp Refills    QUEtiapine  MG Oral Tablet 24 Hr 30 tablet 0     Sig: Take 1 tablet (150 mg total) by mouth nightly.       Last refill date:9/4/2024       Last office visit: 12/12/2024    Follow up due:  No future appointments.

## 2025-01-03 ENCOUNTER — LAB ENCOUNTER (OUTPATIENT)
Dept: LAB | Age: 25
End: 2025-01-03
Attending: FAMILY MEDICINE
Payer: MEDICAID

## 2025-01-03 DIAGNOSIS — E53.8 B12 DEFICIENCY: ICD-10-CM

## 2025-01-03 DIAGNOSIS — Z00.00 ANNUAL PHYSICAL EXAM: ICD-10-CM

## 2025-01-03 DIAGNOSIS — E55.9 VITAMIN D DEFICIENCY: ICD-10-CM

## 2025-01-03 LAB
ALBUMIN SERPL-MCNC: 4.8 G/DL (ref 3.2–4.8)
ALBUMIN/GLOB SERPL: 1.7 {RATIO} (ref 1–2)
ALP LIVER SERPL-CCNC: 64 U/L
ALT SERPL-CCNC: 32 U/L
ANION GAP SERPL CALC-SCNC: 9 MMOL/L (ref 0–18)
AST SERPL-CCNC: 29 U/L (ref ?–34)
BASOPHILS # BLD AUTO: 0.03 X10(3) UL (ref 0–0.2)
BASOPHILS NFR BLD AUTO: 0.6 %
BILIRUB SERPL-MCNC: 1 MG/DL (ref 0.3–1.2)
BUN BLD-MCNC: 10 MG/DL (ref 9–23)
CALCIUM BLD-MCNC: 10.3 MG/DL (ref 8.7–10.4)
CHLORIDE SERPL-SCNC: 104 MMOL/L (ref 98–112)
CHOLEST SERPL-MCNC: 149 MG/DL (ref ?–200)
CO2 SERPL-SCNC: 28 MMOL/L (ref 21–32)
CREAT BLD-MCNC: 1.07 MG/DL
EGFRCR SERPLBLD CKD-EPI 2021: 101 ML/MIN/1.73M2 (ref 60–?)
EOSINOPHIL # BLD AUTO: 0.16 X10(3) UL (ref 0–0.7)
EOSINOPHIL NFR BLD AUTO: 3.1 %
ERYTHROCYTE [DISTWIDTH] IN BLOOD BY AUTOMATED COUNT: 12.4 %
FASTING PATIENT LIPID ANSWER: YES
FASTING STATUS PATIENT QL REPORTED: YES
GLOBULIN PLAS-MCNC: 2.8 G/DL (ref 2–3.5)
GLUCOSE BLD-MCNC: 85 MG/DL (ref 70–99)
HCT VFR BLD AUTO: 46.7 %
HDLC SERPL-MCNC: 33 MG/DL (ref 40–59)
HGB BLD-MCNC: 15.8 G/DL
IMM GRANULOCYTES # BLD AUTO: 0 X10(3) UL (ref 0–1)
IMM GRANULOCYTES NFR BLD: 0 %
LDLC SERPL CALC-MCNC: 92 MG/DL (ref ?–100)
LYMPHOCYTES # BLD AUTO: 1.55 X10(3) UL (ref 1–4)
LYMPHOCYTES NFR BLD AUTO: 30.3 %
MCH RBC QN AUTO: 30 PG (ref 26–34)
MCHC RBC AUTO-ENTMCNC: 33.8 G/DL (ref 31–37)
MCV RBC AUTO: 88.6 FL
MONOCYTES # BLD AUTO: 0.4 X10(3) UL (ref 0.1–1)
MONOCYTES NFR BLD AUTO: 7.8 %
NEUTROPHILS # BLD AUTO: 2.97 X10 (3) UL (ref 1.5–7.7)
NEUTROPHILS # BLD AUTO: 2.97 X10(3) UL (ref 1.5–7.7)
NEUTROPHILS NFR BLD AUTO: 58.2 %
NONHDLC SERPL-MCNC: 116 MG/DL (ref ?–130)
OSMOLALITY SERPL CALC.SUM OF ELEC: 290 MOSM/KG (ref 275–295)
PLATELET # BLD AUTO: 259 10(3)UL (ref 150–450)
POTASSIUM SERPL-SCNC: 4.1 MMOL/L (ref 3.5–5.1)
PROT SERPL-MCNC: 7.6 G/DL (ref 5.7–8.2)
RBC # BLD AUTO: 5.27 X10(6)UL
SODIUM SERPL-SCNC: 141 MMOL/L (ref 136–145)
TRIGL SERPL-MCNC: 134 MG/DL (ref 30–149)
TSI SER-ACNC: 1.11 UIU/ML (ref 0.55–4.78)
VIT B12 SERPL-MCNC: 830 PG/ML (ref 211–911)
VIT D+METAB SERPL-MCNC: 34.6 NG/ML (ref 30–100)
VLDLC SERPL CALC-MCNC: 22 MG/DL (ref 0–30)
WBC # BLD AUTO: 5.1 X10(3) UL (ref 4–11)

## 2025-01-03 PROCEDURE — 85025 COMPLETE CBC W/AUTO DIFF WBC: CPT

## 2025-01-03 PROCEDURE — 84443 ASSAY THYROID STIM HORMONE: CPT

## 2025-01-03 PROCEDURE — 82306 VITAMIN D 25 HYDROXY: CPT

## 2025-01-03 PROCEDURE — 80061 LIPID PANEL: CPT

## 2025-01-03 PROCEDURE — 80053 COMPREHEN METABOLIC PANEL: CPT

## 2025-01-03 PROCEDURE — 82607 VITAMIN B-12: CPT

## 2025-01-03 PROCEDURE — 36415 COLL VENOUS BLD VENIPUNCTURE: CPT

## 2025-01-19 ENCOUNTER — HOSPITAL ENCOUNTER (EMERGENCY)
Age: 25
Discharge: HOME | End: 2025-01-19
Payer: MEDICAID

## 2025-01-19 VITALS
HEIGHT: 69 IN | OXYGEN SATURATION: 100 % | SYSTOLIC BLOOD PRESSURE: 127 MMHG | TEMPERATURE: 101 F | RESPIRATION RATE: 18 BRPM | HEART RATE: 121 BPM | WEIGHT: 135 LBS | DIASTOLIC BLOOD PRESSURE: 74 MMHG | BODY MASS INDEX: 19.99 KG/M2

## 2025-01-19 DIAGNOSIS — J11.1 INFLUENZA: Primary | ICD-10-CM

## 2025-01-19 LAB
POCT INFLUENZA A: POSITIVE
POCT INFLUENZA B: NEGATIVE
SARS-COV-2 RNA RESP QL NAA+PROBE: NOT DETECTED

## 2025-01-19 PROCEDURE — 99283 EMERGENCY DEPT VISIT LOW MDM: CPT

## 2025-01-19 PROCEDURE — 87502 INFLUENZA DNA AMP PROBE: CPT

## 2025-01-19 RX ORDER — IBUPROFEN 600 MG/1
600 TABLET, FILM COATED ORAL ONCE
Status: DISCONTINUED | OUTPATIENT
Start: 2025-01-19 | End: 2025-01-19

## 2025-01-19 RX ORDER — IBUPROFEN 600 MG/1
TABLET, FILM COATED ORAL
Status: DISCONTINUED
Start: 2025-01-19 | End: 2025-01-19

## 2025-01-19 RX ORDER — BENZONATATE 100 MG/1
100 CAPSULE ORAL 3 TIMES DAILY PRN
Qty: 30 CAPSULE | Refills: 0 | Status: SHIPPED | OUTPATIENT
Start: 2025-01-19 | End: 2025-02-18

## 2025-01-19 RX ORDER — ALBUTEROL SULFATE 90 UG/1
2 INHALANT RESPIRATORY (INHALATION) EVERY 4 HOURS PRN
Qty: 1 EACH | Refills: 0 | Status: SHIPPED | OUTPATIENT
Start: 2025-01-19 | End: 2025-02-18

## 2025-01-19 NOTE — DISCHARGE INSTRUCTIONS
Influenza precautions; patient should quarantine until fever free for 24 hours.  Influenza typically last 7 to 10 days.    2 puffs of inhaler every 3-4 hours.  Tessalon for cough suppression    Alternate Tylenol Motrin every 3-4 hours

## 2025-01-19 NOTE — ED PROVIDER NOTES
Patient Seen in: [facility] [place]      History     Chief Complaint   Patient presents with    Cough/URI    Fever     Pt states that he has had a fever, cough, body aches and a sore throat     Stated Complaint: cough, fever, body aches, sore throat    Subjective:   HPI        24-year-old male.  Arrives with his mother who assist with history and physical.  Patient complaining of malaise, myalgia, headache and dry cough for the past 4 days.  Feels very fatigued.  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    Seasonal allergies              Past Surgical History:   Procedure Laterality Date    Tonsillectomy                  Social History     Socioeconomic History    Marital status: Single   Tobacco Use    Smoking status: Never    Smokeless tobacco: Never   Vaping Use    Vaping status: Never Used   Substance and Sexual Activity    Alcohol use: Yes     Comment: occasional    Drug use: Yes     Types: Cannabis     Comment: daily- trying to cut down     Social Drivers of Health     Financial Resource Strain: Not on File (1/4/2024)    Received from [facility]    Financial Resource Strain     Financial Resource Strain: 0   Food Insecurity: Not on File (9/26/2024)    Received from [employer]    Food Insecurity     Food: 0   Transportation Needs: Not on File (1/4/2024)    Received from [facility]    Transportation Needs     Transportation: 0   Physical Activity: Not on File (1/4/2024)    Received from [facility]    Physical Activity     Physical Activity: 0   Stress: Not on File (1/4/2024)    Received from [facility]    Stress     Stress: 0   Social Connections: Not on File (9/15/2024)    Received from [employer]    Social Connections     Connectedness: 0   Housing Stability: Not on File  (2024)    Received from [facility] Stability     Housin                  Physical Exam     ED Triage Vitals [25 1413]   /74   Pulse (!) 133   Resp 18   Temp (!) 100.9 °F (38.3 °C)   Temp src Oral   SpO2 99 %   O2 Device None (Room air)       Current Vitals:   Vital Signs  BP: 127/74  Pulse: (!) 133  Resp: 18  Temp: (!) 100.9 °F (38.3 °C)  Temp src: Oral    Oxygen Therapy  SpO2: 99 %  O2 Device: None (Room air)        Physical Exam    Gen: Well appearing, well groomed, alert and aware x 3  Neck: Supple, full range of motion, no thyromegaly or lymphadenopathy.  Eye examination: EOMs are intact, normal conjunctival  ENT: No injection noted to the bilateral auditory canals; no loss of landmarks. Normal nasal mucosa without audible nasal congestion.  Oropharynx is patent without evidence of erythema, exudates or deviation.  No stridor to auscultation  Lung: No distress, RR, no retraction, breath sounds are clear bilaterally  Cardio: Regular rate and rhythm, normal S1-S2, no murmur appreciable  Skin: No sign of trauma, Skin warm and dry, no induration or sign of infection.  No rash noted      ED Course     Labs Reviewed   POCT FLU TEST - Abnormal; Notable for the following components:       Result Value    POCT INFLUENZA A Positive (*)     All other components within normal limits    Narrative:     This assay is a rapid molecular in vitro test utilizing nucleic acid amplification of influenza A and B viral RNA.   RAPID SARS-COV-2 BY PCR - Normal               MDM          While in triage, influenza and COVID were obtained.  Returned positive for influenza A.  Currently on day 4 symptoms.  Advised to push clear fluids.  Alternate Tylenol Motrin for hours.  Provided with albuterol and Tessalon.  Work and school note provided.    Medical Decision Making      Disposition and Plan     Clinical Impression:  1. Influenza         Disposition:  Discharge  2025  3:01 pm    Follow-up:  Juan Carlos  DO Cristobal  [address] [place] [address] [address]  [facility] [id number] [phone]    Follow up            Medications Prescribed:  Current Discharge Medication List        START taking these medications    Details   albuterol 108 (90 Base) MCG/ACT Inhalation Aero Soln Inhale 2 puffs into the lungs every 4 (four) hours as needed for Wheezing.  Qty: 1 each, Refills: 0      benzonatate 100 MG Oral Cap Take 1 capsule (100 mg total) by mouth 3 (three) times daily as needed for cough.  Qty: 30 capsule, Refills: 0                 Supplementary Documentation:

## 2025-02-04 ENCOUNTER — LAB ENCOUNTER (OUTPATIENT)
Dept: LAB | Age: 25
End: 2025-02-04
Attending: FAMILY MEDICINE
Payer: MEDICAID

## 2025-02-04 ENCOUNTER — OFFICE VISIT (OUTPATIENT)
Dept: FAMILY MEDICINE CLINIC | Facility: CLINIC | Age: 25
End: 2025-02-04
Payer: MEDICAID

## 2025-02-04 VITALS
BODY MASS INDEX: 22.66 KG/M2 | HEART RATE: 128 BPM | OXYGEN SATURATION: 97 % | HEIGHT: 69 IN | SYSTOLIC BLOOD PRESSURE: 98 MMHG | WEIGHT: 153 LBS | RESPIRATION RATE: 16 BRPM | DIASTOLIC BLOOD PRESSURE: 58 MMHG

## 2025-02-04 DIAGNOSIS — R53.83 FATIGUE, UNSPECIFIED TYPE: ICD-10-CM

## 2025-02-04 DIAGNOSIS — M25.531 PAIN IN BOTH WRISTS: ICD-10-CM

## 2025-02-04 DIAGNOSIS — M54.50 CHRONIC BILATERAL LOW BACK PAIN WITHOUT SCIATICA: ICD-10-CM

## 2025-02-04 DIAGNOSIS — M25.532 PAIN IN BOTH WRISTS: ICD-10-CM

## 2025-02-04 DIAGNOSIS — G47.00 INSOMNIA, UNSPECIFIED TYPE: Primary | ICD-10-CM

## 2025-02-04 DIAGNOSIS — R00.0 TACHYCARDIA: ICD-10-CM

## 2025-02-04 DIAGNOSIS — F98.8 ATTENTION DEFICIT DISORDER, UNSPECIFIED TYPE: ICD-10-CM

## 2025-02-04 DIAGNOSIS — G89.29 CHRONIC PAIN OF BOTH LOWER EXTREMITIES: ICD-10-CM

## 2025-02-04 DIAGNOSIS — M79.604 CHRONIC PAIN OF BOTH LOWER EXTREMITIES: ICD-10-CM

## 2025-02-04 DIAGNOSIS — G89.29 CHRONIC BILATERAL LOW BACK PAIN WITHOUT SCIATICA: ICD-10-CM

## 2025-02-04 DIAGNOSIS — M79.605 CHRONIC PAIN OF BOTH LOWER EXTREMITIES: ICD-10-CM

## 2025-02-04 DIAGNOSIS — F98.8 ATTENTION DEFICIT DISORDER (ADD) WITHOUT HYPERACTIVITY: ICD-10-CM

## 2025-02-04 LAB
CRP SERPL-MCNC: <0.4 MG/DL (ref ?–0.5)
ERYTHROCYTE [SEDIMENTATION RATE] IN BLOOD: 5 MM/HR

## 2025-02-04 PROCEDURE — 86038 ANTINUCLEAR ANTIBODIES: CPT

## 2025-02-04 PROCEDURE — 86225 DNA ANTIBODY NATIVE: CPT

## 2025-02-04 PROCEDURE — 99214 OFFICE O/P EST MOD 30 MIN: CPT | Performed by: FAMILY MEDICINE

## 2025-02-04 PROCEDURE — 36415 COLL VENOUS BLD VENIPUNCTURE: CPT

## 2025-02-04 PROCEDURE — 86140 C-REACTIVE PROTEIN: CPT

## 2025-02-04 PROCEDURE — 85652 RBC SED RATE AUTOMATED: CPT

## 2025-02-04 RX ORDER — DEXTROAMPHETAMINE SACCHARATE, AMPHETAMINE ASPARTATE MONOHYDRATE, DEXTROAMPHETAMINE SULFATE AND AMPHETAMINE SULFATE 5; 5; 5; 5 MG/1; MG/1; MG/1; MG/1
20 CAPSULE, EXTENDED RELEASE ORAL DAILY
Qty: 30 CAPSULE | Refills: 0 | Status: SHIPPED | OUTPATIENT
Start: 2025-03-07 | End: 2025-04-06

## 2025-02-04 RX ORDER — DEXTROAMPHETAMINE SACCHARATE, AMPHETAMINE ASPARTATE MONOHYDRATE, DEXTROAMPHETAMINE SULFATE AND AMPHETAMINE SULFATE 5; 5; 5; 5 MG/1; MG/1; MG/1; MG/1
20 CAPSULE, EXTENDED RELEASE ORAL DAILY
Qty: 30 CAPSULE | Refills: 0 | Status: SHIPPED | OUTPATIENT
Start: 2025-04-07 | End: 2025-05-07

## 2025-02-04 RX ORDER — METHYLPHENIDATE HYDROCHLORIDE 10 MG/1
10 TABLET ORAL AS NEEDED
Qty: 30 TABLET | Refills: 0 | Status: SHIPPED | OUTPATIENT
Start: 2025-02-04

## 2025-02-04 RX ORDER — DEXTROAMPHETAMINE SACCHARATE, AMPHETAMINE ASPARTATE MONOHYDRATE, DEXTROAMPHETAMINE SULFATE AND AMPHETAMINE SULFATE 5; 5; 5; 5 MG/1; MG/1; MG/1; MG/1
20 CAPSULE, EXTENDED RELEASE ORAL DAILY
Qty: 30 CAPSULE | Refills: 0 | Status: SHIPPED | OUTPATIENT
Start: 2025-02-04 | End: 2025-03-06

## 2025-02-04 NOTE — PROGRESS NOTES
Subjective:   Patient ID: Oskar Martinez is a 24 year old male.    Chronic fatigue.  Muscle pains in legs and back.  Hard to stand all day at work.  Also getting more pain now in wrist and finger.  Drinking enough water.  Saying he needs to see physical therapist and not a .        History/Other:   Review of Systems   All other systems reviewed and are negative.    Current Outpatient Medications   Medication Sig Dispense Refill    amitriptyline 25 MG Oral Tab Take 1 tablet (25 mg total) by mouth nightly. 90 tablet 1    Amphetamine-Dextroamphet ER (ADDERALL XR) 20 MG Oral Capsule SR 24 Hr Take 1 capsule (20 mg total) by mouth daily. 30 capsule 0    [START ON 3/7/2025] Amphetamine-Dextroamphet ER (ADDERALL XR) 20 MG Oral Capsule SR 24 Hr Take 1 capsule (20 mg total) by mouth daily. 30 capsule 0    [START ON 4/7/2025] Amphetamine-Dextroamphet ER (ADDERALL XR) 20 MG Oral Capsule SR 24 Hr Take 1 capsule (20 mg total) by mouth daily. 30 capsule 0    methylphenidate 10 MG Oral Tab Take 1 tablet (10 mg total) by mouth as needed (a couple times a week in afternoon). To be taken as needed with adderall ER 20mg daily. 30 tablet 0    albuterol 108 (90 Base) MCG/ACT Inhalation Aero Soln Inhale 2 puffs into the lungs every 4 (four) hours as needed for Wheezing. 1 each 0    benzonatate 100 MG Oral Cap Take 1 capsule (100 mg total) by mouth 3 (three) times daily as needed for cough. (Patient not taking: Reported on 2/4/2025) 30 capsule 0    predniSONE 20 MG Oral Tab 2.5 tab day 1-3, 2 tab day 4, 1.5 tab day 5, 1 tab day 6, 0.5 tab day 7 (Patient not taking: Reported on 2/4/2025) 13 tablet 0    cloNIDine 0.1 MG Oral Tab Take 1 tablet every day by oral route as needed. (Patient not taking: Reported on 2/4/2025)      cetirizine 10 MG Oral Tab as needed. (Patient not taking: Reported on 2/4/2025)       Allergies:Allergies[1]    Objective:   Physical Exam  Vitals reviewed.   Constitutional:       General: He is not in acute  distress.     Appearance: He is well-developed. He is not diaphoretic.   Eyes:      General: No scleral icterus.        Right eye: No discharge.         Left eye: No discharge.      Conjunctiva/sclera: Conjunctivae normal.   Cardiovascular:      Rate and Rhythm: Normal rate and regular rhythm.      Heart sounds: Normal heart sounds.   Pulmonary:      Effort: Pulmonary effort is normal. No respiratory distress.      Breath sounds: Normal breath sounds. No wheezing or rales.         Assessment & Plan:   1. Insomnia, unspecified type    2. Tachycardia    3. Chronic pain of both lower extremities    4. Chronic bilateral low back pain without sciatica    5. Fatigue, unspecified type    6. Pain in both wrists    7. Attention deficit disorder, unspecified type    8. Attention deficit disorder (ADD) without hyperactivity      1. Insomnia, unspecified type  - amitriptyline 25 MG Oral Tab; Take 1 tablet (25 mg total) by mouth nightly.  Dispense: 90 tablet; Refill: 1    2. Tachycardia  - EKG 12 Lead; Future    3. Chronic pain of both lower extremities  - Connective Tissue Disease (ALEXIS) Screen, Reflex Specific Antibody; Future  - Sed Rate, Westergren (Automated) [E]; Future  - C-Reactive Protein [E]; Future  - OP REFERRAL TO [facility]    4. Chronic bilateral low back pain without sciatica  - Connective Tissue Disease (ALEXIS) Screen, Reflex Specific Antibody; Future  - Sed Rate, Westergren (Automated) [E]; Future  - C-Reactive Protein [E]; Future  - OP REFERRAL TO [facility]    5. Fatigue, unspecified type  - Connective Tissue Disease (ALEXIS) Screen, Reflex Specific Antibody; Future  - Sed Rate, Westergren (Automated) [E]; Future  - C-Reactive Protein [E]; Future  - OP REFERRAL TO [facility]    6. Pain in both wrists  - Connective Tissue Disease (ALEXIS) Screen, Reflex Specific Antibody; Future  - Sed Rate, Westergren (Automated) [E]; Future  - C-Reactive Protein [E];  Future  - OP REFERRAL TO [facility]    Orders Placed This Encounter   Procedures    Connective Tissue Disease (ALEXIS) Screen, Reflex Specific Antibody    Sed Rate, [place]merryren (Automated) [E]    C-Reactive Protein [E]       Meds This Visit:  Requested Prescriptions     Signed Prescriptions Disp Refills    amitriptyline 25 MG Oral Tab 90 tablet 1     Sig: Take 1 tablet (25 mg total) by mouth nightly.    Amphetamine-Dextroamphet ER (ADDERALL XR) 20 MG Oral Capsule SR 24 Hr 30 capsule 0     Sig: Take 1 capsule (20 mg total) by mouth daily.    Amphetamine-Dextroamphet ER (ADDERALL XR) 20 MG Oral Capsule SR 24 Hr 30 capsule 0     Sig: Take 1 capsule (20 mg total) by mouth daily.    Amphetamine-Dextroamphet ER (ADDERALL XR) 20 MG Oral Capsule SR 24 Hr 30 capsule 0     Sig: Take 1 capsule (20 mg total) by mouth daily.    methylphenidate 10 MG Oral Tab 30 tablet 0     Sig: Take 1 tablet (10 mg total) by mouth as needed (a couple times a week in afternoon). To be taken as needed with adderall ER 20mg daily.       Imaging & Referrals:  OP REFERRAL TO [facility]  EKG 12-LEAD         [1]   Allergies  Allergen Reactions    Dander UNKNOWN and HIVES    Molds & Smuts UNKNOWN and HIVES    Uncaria Tomentosa, Cats Claw SWELLING    Cat Hair Extract Coughing, ITCHING and PHOTOSENSITIVITY      Alternatives Discussed Intro (Do Not Add Period): I discussed alternative treatments to Mohs surgery and specifically discussed the risks and benefits of

## 2025-02-05 LAB
DSDNA IGG SERPL IA-ACNC: 5.7 IU/ML
ENA AB SER QL IA: 0.2 UG/L
ENA AB SER QL IA: NEGATIVE

## 2025-05-22 DIAGNOSIS — F98.8 ATTENTION DEFICIT DISORDER (ADD) WITHOUT HYPERACTIVITY: ICD-10-CM

## 2025-05-22 RX ORDER — METHYLPHENIDATE HYDROCHLORIDE 10 MG/1
10 TABLET ORAL AS NEEDED
Qty: 30 TABLET | Refills: 0 | Status: SHIPPED | OUTPATIENT
Start: 2025-05-22

## 2025-05-22 NOTE — TELEPHONE ENCOUNTER
A refill request was received for:  Requested Prescriptions     Pending Prescriptions Disp Refills    methylphenidate 10 MG Oral Tab 30 tablet 0     Sig: Take 1 tablet (10 mg total) by mouth as needed (a couple times a week in afternoon). To be taken as needed with adderall ER 20mg daily.       Last refill date:2/4/2025       Last office visit: 2/2025    Follow up due:  No future appointments.

## 2025-07-01 ENCOUNTER — OFFICE VISIT (OUTPATIENT)
Dept: FAMILY MEDICINE CLINIC | Facility: CLINIC | Age: 25
End: 2025-07-01
Payer: MEDICAID

## 2025-07-01 VITALS
HEIGHT: 69 IN | DIASTOLIC BLOOD PRESSURE: 80 MMHG | RESPIRATION RATE: 16 BRPM | OXYGEN SATURATION: 97 % | WEIGHT: 156.63 LBS | SYSTOLIC BLOOD PRESSURE: 108 MMHG | BODY MASS INDEX: 23.2 KG/M2 | HEART RATE: 78 BPM

## 2025-07-01 DIAGNOSIS — M79.604 CHRONIC PAIN OF BOTH LOWER EXTREMITIES: ICD-10-CM

## 2025-07-01 DIAGNOSIS — G89.29 CHRONIC BILATERAL LOW BACK PAIN WITHOUT SCIATICA: ICD-10-CM

## 2025-07-01 DIAGNOSIS — G89.29 CHRONIC PAIN OF BOTH LOWER EXTREMITIES: ICD-10-CM

## 2025-07-01 DIAGNOSIS — M79.605 CHRONIC PAIN OF BOTH LOWER EXTREMITIES: ICD-10-CM

## 2025-07-01 DIAGNOSIS — M54.50 CHRONIC BILATERAL LOW BACK PAIN WITHOUT SCIATICA: ICD-10-CM

## 2025-07-01 DIAGNOSIS — G47.00 INSOMNIA, UNSPECIFIED TYPE: Primary | ICD-10-CM

## 2025-07-01 DIAGNOSIS — F98.8 ATTENTION DEFICIT DISORDER (ADD) WITHOUT HYPERACTIVITY: ICD-10-CM

## 2025-07-01 DIAGNOSIS — R53.83 FATIGUE, UNSPECIFIED TYPE: ICD-10-CM

## 2025-07-01 PROCEDURE — 99214 OFFICE O/P EST MOD 30 MIN: CPT | Performed by: FAMILY MEDICINE

## 2025-07-01 RX ORDER — AMITRIPTYLINE HYDROCHLORIDE 50 MG/1
50 TABLET ORAL NIGHTLY
Qty: 90 TABLET | Refills: 1 | Status: SHIPPED | OUTPATIENT
Start: 2025-07-01

## 2025-07-01 RX ORDER — DEXTROAMPHETAMINE SACCHARATE, AMPHETAMINE ASPARTATE MONOHYDRATE, DEXTROAMPHETAMINE SULFATE AND AMPHETAMINE SULFATE 5; 5; 5; 5 MG/1; MG/1; MG/1; MG/1
20 CAPSULE, EXTENDED RELEASE ORAL DAILY
Qty: 30 CAPSULE | Refills: 0 | Status: SHIPPED | OUTPATIENT
Start: 2025-09-01 | End: 2025-10-01

## 2025-07-01 RX ORDER — DEXTROAMPHETAMINE SACCHARATE, AMPHETAMINE ASPARTATE MONOHYDRATE, DEXTROAMPHETAMINE SULFATE AND AMPHETAMINE SULFATE 5; 5; 5; 5 MG/1; MG/1; MG/1; MG/1
20 CAPSULE, EXTENDED RELEASE ORAL DAILY
Qty: 30 CAPSULE | Refills: 0 | Status: SHIPPED | OUTPATIENT
Start: 2025-07-01 | End: 2025-07-31

## 2025-07-01 RX ORDER — METHYLPHENIDATE HYDROCHLORIDE 10 MG/1
10 TABLET ORAL DAILY
Qty: 30 TABLET | Refills: 0 | Status: SHIPPED | OUTPATIENT
Start: 2025-07-01 | End: 2025-07-31

## 2025-07-01 RX ORDER — DEXTROAMPHETAMINE SACCHARATE, AMPHETAMINE ASPARTATE MONOHYDRATE, DEXTROAMPHETAMINE SULFATE AND AMPHETAMINE SULFATE 5; 5; 5; 5 MG/1; MG/1; MG/1; MG/1
20 CAPSULE, EXTENDED RELEASE ORAL DAILY
Qty: 30 CAPSULE | Refills: 0 | Status: SHIPPED | OUTPATIENT
Start: 2025-08-01 | End: 2025-08-31

## 2025-07-01 RX ORDER — METHYLPHENIDATE HYDROCHLORIDE 10 MG/1
10 TABLET ORAL DAILY
Qty: 30 TABLET | Refills: 0 | Status: SHIPPED | OUTPATIENT
Start: 2025-08-01 | End: 2025-08-31

## 2025-07-01 RX ORDER — METHYLPHENIDATE HYDROCHLORIDE 10 MG/1
10 TABLET ORAL DAILY
Qty: 30 TABLET | Refills: 0 | Status: SHIPPED | OUTPATIENT
Start: 2025-09-01 | End: 2025-10-01

## 2025-07-01 NOTE — PROGRESS NOTES
Subjective:   Patient ID: Oskar Martinez is a 24 year old male.  ADD, chronic, on ADDERALL while working,not taking everyday, feels better, no side effects.     Insomnia, chronic, on amitriptyline 25 mg, that helps mildly.    B/L leg pain, he has to work in standing position for a long time, no swelling and no calf tenderness.  Gets fatigue with prolonged standing.      am    History/Other:   Review of Systems   All other systems reviewed and are negative.    Current Medications[1]  Allergies:Allergies[2]    Objective:   Physical Exam  Constitutional:       Appearance: Normal appearance. He is not ill-appearing.   Eyes:      General:         Right eye: No discharge.         Left eye: No discharge.      Conjunctiva/sclera: Conjunctivae normal.   Cardiovascular:      Rate and Rhythm: Normal rate and regular rhythm.      Heart sounds: No murmur heard.  Pulmonary:      Effort: Pulmonary effort is normal. No respiratory distress.      Breath sounds: Normal breath sounds. No wheezing.   Musculoskeletal:      Comments: On leg swelling, no calf tenderness.   Neurological:      Mental Status: He is alert.         Assessment & Plan:   - take breaks on ADDERALL for 1-2 weeks for every 3-4 months.  1. Insomnia, unspecified type    2. Attention deficit disorder (ADD) without hyperactivity    3. Chronic pain of both lower extremities    4. Chronic bilateral low back pain without sciatica    5. Fatigue, unspecified type      Follow up in 6 months for physical.  1. Insomnia, unspecified type  - amitriptyline 50 MG Oral Tab; Take 1 tablet (50 mg total) by mouth nightly.  Dispense: 90 tablet; Refill: 1    2. Attention deficit disorder (ADD) without hyperactivity  - Amphetamine-Dextroamphet ER (ADDERALL XR) 20 MG Oral Capsule SR 24 Hr; Take 1 capsule (20 mg total) by mouth daily.  Dispense: 30 capsule; Refill: 0  - Amphetamine-Dextroamphet ER (ADDERALL XR) 20 MG Oral Capsule SR 24 Hr; Take 1 capsule (20 mg total) by mouth daily.   Dispense: 30 capsule; Refill: 0  - Amphetamine-Dextroamphet ER (ADDERALL XR) 20 MG Oral Capsule SR 24 Hr; Take 1 capsule (20 mg total) by mouth daily.  Dispense: 30 capsule; Refill: 0  - methylphenidate (RITALIN) 10 MG Oral Tab; Take 1 tablet (10 mg total) by mouth daily.  Dispense: 30 tablet; Refill: 0  - methylphenidate (RITALIN) 10 MG Oral Tab; Take 1 tablet (10 mg total) by mouth daily.  Dispense: 30 tablet; Refill: 0  - methylphenidate (RITALIN) 10 MG Oral Tab; Take 1 tablet (10 mg total) by mouth daily.  Dispense: 30 tablet; Refill: 0    3. Chronic pain of both lower extremities  - Physical Therapy Referral - [facility]    4. Chronic bilateral low back pain without sciatica  - Physical Therapy Referral - [facility]    5. Fatigue, unspecified type  - Physical Therapy Referral - [facility]      Meds This Visit:  Requested Prescriptions     Signed Prescriptions Disp Refills    amitriptyline 50 MG Oral Tab 90 tablet 1     Sig: Take 1 tablet (50 mg total) by mouth nightly.    Amphetamine-Dextroamphet ER (ADDERALL XR) 20 MG Oral Capsule SR 24 Hr 30 capsule 0     Sig: Take 1 capsule (20 mg total) by mouth daily.    Amphetamine-Dextroamphet ER (ADDERALL XR) 20 MG Oral Capsule SR 24 Hr 30 capsule 0     Sig: Take 1 capsule (20 mg total) by mouth daily.    Amphetamine-Dextroamphet ER (ADDERALL XR) 20 MG Oral Capsule SR 24 Hr 30 capsule 0     Sig: Take 1 capsule (20 mg total) by mouth daily.    methylphenidate (RITALIN) 10 MG Oral Tab 30 tablet 0     Sig: Take 1 tablet (10 mg total) by mouth daily.    methylphenidate (RITALIN) 10 MG Oral Tab 30 tablet 0     Sig: Take 1 tablet (10 mg total) by mouth daily.    methylphenidate (RITALIN) 10 MG Oral Tab 30 tablet 0     Sig: Take 1 tablet (10 mg total) by mouth daily.       Imaging & Referrals:  OP REFERRAL TO [facility]    Note written by varsha.  Varsha is in the room with me.  Varsha has written note according to my dictation.  Reviewed  scribe note.  Changed as appropriate.       [1]   Current Outpatient Medications   Medication Sig Dispense Refill    amitriptyline 50 MG Oral Tab Take 1 tablet (50 mg total) by mouth nightly. 90 tablet 1    Amphetamine-Dextroamphet ER (ADDERALL XR) 20 MG Oral Capsule SR 24 Hr Take 1 capsule (20 mg total) by mouth daily. 30 capsule 0    [START ON 8/1/2025] Amphetamine-Dextroamphet ER (ADDERALL XR) 20 MG Oral Capsule SR 24 Hr Take 1 capsule (20 mg total) by mouth daily. 30 capsule 0    [START ON 9/1/2025] Amphetamine-Dextroamphet ER (ADDERALL XR) 20 MG Oral Capsule SR 24 Hr Take 1 capsule (20 mg total) by mouth daily. 30 capsule 0    methylphenidate (RITALIN) 10 MG Oral Tab Take 1 tablet (10 mg total) by mouth daily. 30 tablet 0    [START ON 8/1/2025] methylphenidate (RITALIN) 10 MG Oral Tab Take 1 tablet (10 mg total) by mouth daily. 30 tablet 0    [START ON 9/1/2025] methylphenidate (RITALIN) 10 MG Oral Tab Take 1 tablet (10 mg total) by mouth daily. 30 tablet 0    methylphenidate 10 MG Oral Tab Take 1 tablet (10 mg total) by mouth as needed (a couple times a week in afternoon). To be taken as needed with adderall ER 20mg daily. 30 tablet 0   [2]   Allergies  Allergen Reactions    Dander UNKNOWN and HIVES    Molds & Smuts UNKNOWN and HIVES    Uncaria Tomentosa, Cats Claw SWELLING    Cat Hair Extract Coughing, ITCHING and PHOTOSENSITIVITY

## 2025-07-29 DIAGNOSIS — G47.00 INSOMNIA, UNSPECIFIED TYPE: ICD-10-CM

## 2025-07-29 RX ORDER — AMITRIPTYLINE HYDROCHLORIDE 50 MG/1
50 TABLET ORAL NIGHTLY
Qty: 90 TABLET | Refills: 1 | Status: SHIPPED | OUTPATIENT
Start: 2025-07-29

## (undated) DEVICE — SPONGE: SPECIALTY TONSIL XR MED 100/CS: Brand: MEDICAL ACTION INDUSTRIES

## (undated) DEVICE — SOLUTION IRRIG 1000ML 0.9% NACL USP BTL

## (undated) DEVICE — PROCISE XP WAND: Brand: COBLATION

## (undated) DEVICE — STERILE POLYISOPRENE POWDER-FREE SURGICAL GLOVES: Brand: PROTEXIS

## (undated) DEVICE — T & A CDS: Brand: MEDLINE INDUSTRIES, INC.

## (undated) DEVICE — ELECTRODE ES L2.75IN XLN STD BLDE MOD E-Z CLN

## (undated) DEVICE — STERILE SYNTHETIC POLYISOPRENE POWDER-FREE SURGICAL GLOVES WITH HYDROGEL COATING: Brand: PROTEXIS

## (undated) DEVICE — PENCIL TELESCOPE MEGADYNE SE

## (undated) NOTE — LETTER
Date: 10/25/2023    Patient Name: Nicolette Lynn          To Whom it may concern: This letter has been written at the patient's request. The above patient was seen at the [facility] for treatment of a medical condition.     This patient should be excused from attending school from 10/25/2023 through 10/29/2023    The patient may return to school on 10/30/2023 with the following limitations, patient should refrain from singing until 11/01/2023        Sincerely,    Siobhan Dillard MD

## (undated) NOTE — LETTER
Date & Time: 1/19/2025, 3:01 PM  Patient: Oskar Martinez  Encounter Provider(s):    Tori Decker PA-C       To Whom It May Concern:    Oskar Martinez was seen and treated in our department on 1/19/2025.  Influenza precautions; patient should quarantine until fever free for 24 hours.  Influenza typically last 7 to 10 days.    If you have any questions or concerns, please do not hesitate to call.        _____________________________  Physician/[facility] Signature